# Patient Record
Sex: MALE | Race: BLACK OR AFRICAN AMERICAN | Employment: OTHER | URBAN - METROPOLITAN AREA
[De-identification: names, ages, dates, MRNs, and addresses within clinical notes are randomized per-mention and may not be internally consistent; named-entity substitution may affect disease eponyms.]

---

## 2020-01-26 ENCOUNTER — HOSPITAL ENCOUNTER (EMERGENCY)
Age: 75
Discharge: HOME | End: 2020-01-26
Attending: STUDENT IN AN ORGANIZED HEALTH CARE EDUCATION/TRAINING PROGRAM | Admitting: STUDENT IN AN ORGANIZED HEALTH CARE EDUCATION/TRAINING PROGRAM
Payer: MEDICARE

## 2020-01-26 ENCOUNTER — APPOINTMENT (OUTPATIENT)
Dept: GENERAL RADIOLOGY | Age: 75
End: 2020-01-26
Attending: STUDENT IN AN ORGANIZED HEALTH CARE EDUCATION/TRAINING PROGRAM
Payer: MEDICARE

## 2020-01-26 ENCOUNTER — APPOINTMENT (OUTPATIENT)
Dept: CT IMAGING | Age: 75
End: 2020-01-26
Attending: STUDENT IN AN ORGANIZED HEALTH CARE EDUCATION/TRAINING PROGRAM
Payer: MEDICARE

## 2020-01-26 VITALS
WEIGHT: 242 LBS | TEMPERATURE: 97.7 F | OXYGEN SATURATION: 99 % | RESPIRATION RATE: 18 BRPM | BODY MASS INDEX: 36.68 KG/M2 | SYSTOLIC BLOOD PRESSURE: 174 MMHG | DIASTOLIC BLOOD PRESSURE: 77 MMHG | HEIGHT: 68 IN | HEART RATE: 59 BPM

## 2020-01-26 DIAGNOSIS — R10.84 ABDOMINAL PAIN, GENERALIZED: Primary | ICD-10-CM

## 2020-01-26 DIAGNOSIS — N28.89 RIGHT RENAL MASS: ICD-10-CM

## 2020-01-26 DIAGNOSIS — R91.8 MULTIPLE LUNG NODULES ON CT: ICD-10-CM

## 2020-01-26 LAB
ALBUMIN SERPL-MCNC: 3.7 G/DL (ref 3.5–5)
ALBUMIN/GLOB SERPL: 0.9 {RATIO} (ref 1.1–2.2)
ALP SERPL-CCNC: 104 U/L (ref 45–117)
ALT SERPL-CCNC: 21 U/L (ref 12–78)
ANION GAP SERPL CALC-SCNC: 0 MMOL/L (ref 5–15)
APPEARANCE UR: CLEAR
AST SERPL-CCNC: 17 U/L (ref 15–37)
BACTERIA URNS QL MICRO: NEGATIVE /HPF
BASOPHILS # BLD: 0 K/UL (ref 0–0.1)
BASOPHILS NFR BLD: 0 % (ref 0–1)
BILIRUB SERPL-MCNC: 0.7 MG/DL (ref 0.2–1)
BILIRUB UR QL: NEGATIVE
BUN SERPL-MCNC: 17 MG/DL (ref 6–20)
BUN/CREAT SERPL: 12 (ref 12–20)
CALCIUM SERPL-MCNC: 9 MG/DL (ref 8.5–10.1)
CHLORIDE SERPL-SCNC: 107 MMOL/L (ref 97–108)
CO2 SERPL-SCNC: 29 MMOL/L (ref 21–32)
COLOR UR: ABNORMAL
COMMENT, HOLDF: NORMAL
CREAT SERPL-MCNC: 1.41 MG/DL (ref 0.7–1.3)
DIFFERENTIAL METHOD BLD: ABNORMAL
EOSINOPHIL # BLD: 0.1 K/UL (ref 0–0.4)
EOSINOPHIL NFR BLD: 2 % (ref 0–7)
EPITH CASTS URNS QL MICRO: ABNORMAL /LPF
ERYTHROCYTE [DISTWIDTH] IN BLOOD BY AUTOMATED COUNT: 14.4 % (ref 11.5–14.5)
GLOBULIN SER CALC-MCNC: 3.9 G/DL (ref 2–4)
GLUCOSE SERPL-MCNC: 105 MG/DL (ref 65–100)
GLUCOSE UR STRIP.AUTO-MCNC: NEGATIVE MG/DL
HCT VFR BLD AUTO: 39.7 % (ref 36.6–50.3)
HGB BLD-MCNC: 11.7 G/DL (ref 12.1–17)
HGB UR QL STRIP: NEGATIVE
HYALINE CASTS URNS QL MICRO: ABNORMAL /LPF (ref 0–5)
IMM GRANULOCYTES # BLD AUTO: 0 K/UL (ref 0–0.04)
IMM GRANULOCYTES NFR BLD AUTO: 0 % (ref 0–0.5)
KETONES UR QL STRIP.AUTO: NEGATIVE MG/DL
LEUKOCYTE ESTERASE UR QL STRIP.AUTO: NEGATIVE
LIPASE SERPL-CCNC: 103 U/L (ref 73–393)
LYMPHOCYTES # BLD: 0.8 K/UL (ref 0.8–3.5)
LYMPHOCYTES NFR BLD: 20 % (ref 12–49)
MCH RBC QN AUTO: 27.4 PG (ref 26–34)
MCHC RBC AUTO-ENTMCNC: 29.5 G/DL (ref 30–36.5)
MCV RBC AUTO: 93 FL (ref 80–99)
MONOCYTES # BLD: 0.2 K/UL (ref 0–1)
MONOCYTES NFR BLD: 6 % (ref 5–13)
NEUTS SEG # BLD: 2.7 K/UL (ref 1.8–8)
NEUTS SEG NFR BLD: 72 % (ref 32–75)
NITRITE UR QL STRIP.AUTO: NEGATIVE
NRBC # BLD: 0 K/UL (ref 0–0.01)
NRBC BLD-RTO: 0 PER 100 WBC
PH UR STRIP: 6.5 [PH] (ref 5–8)
PLATELET # BLD AUTO: 177 K/UL (ref 150–400)
PMV BLD AUTO: 11.2 FL (ref 8.9–12.9)
POTASSIUM SERPL-SCNC: 5 MMOL/L (ref 3.5–5.1)
PROT SERPL-MCNC: 7.6 G/DL (ref 6.4–8.2)
PROT UR STRIP-MCNC: ABNORMAL MG/DL
RBC # BLD AUTO: 4.27 M/UL (ref 4.1–5.7)
RBC #/AREA URNS HPF: ABNORMAL /HPF (ref 0–5)
RBC MORPH BLD: ABNORMAL
SAMPLES BEING HELD,HOLD: NORMAL
SODIUM SERPL-SCNC: 136 MMOL/L (ref 136–145)
SP GR UR REFRACTOMETRY: 1.01 (ref 1–1.03)
UR CULT HOLD, URHOLD: NORMAL
UROBILINOGEN UR QL STRIP.AUTO: 1 EU/DL (ref 0.2–1)
WBC # BLD AUTO: 3.8 K/UL (ref 4.1–11.1)
WBC URNS QL MICRO: ABNORMAL /HPF (ref 0–4)

## 2020-01-26 PROCEDURE — 36415 COLL VENOUS BLD VENIPUNCTURE: CPT

## 2020-01-26 PROCEDURE — 74176 CT ABD & PELVIS W/O CONTRAST: CPT

## 2020-01-26 PROCEDURE — 83690 ASSAY OF LIPASE: CPT

## 2020-01-26 PROCEDURE — 85025 COMPLETE CBC W/AUTO DIFF WBC: CPT

## 2020-01-26 PROCEDURE — 99284 EMERGENCY DEPT VISIT MOD MDM: CPT

## 2020-01-26 PROCEDURE — 80053 COMPREHEN METABOLIC PANEL: CPT

## 2020-01-26 PROCEDURE — 71045 X-RAY EXAM CHEST 1 VIEW: CPT

## 2020-01-26 PROCEDURE — 81001 URINALYSIS AUTO W/SCOPE: CPT

## 2020-01-26 NOTE — DISCHARGE INSTRUCTIONS
Patient Education        Abdominal Pain: Care Instructions  Your Care Instructions    Abdominal pain has many possible causes. Some aren't serious and get better on their own in a few days. Others need more testing and treatment. If your pain continues or gets worse, you need to be rechecked and may need more tests to find out what is wrong. You may need surgery to correct the problem. Don't ignore new symptoms, such as fever, nausea and vomiting, urination problems, pain that gets worse, and dizziness. These may be signs of a more serious problem. Your doctor may have recommended a follow-up visit in the next 8 to 12 hours. If you are not getting better, you may need more tests or treatment. The doctor has checked you carefully, but problems can develop later. If you notice any problems or new symptoms, get medical treatment right away. Follow-up care is a key part of your treatment and safety. Be sure to make and go to all appointments, and call your doctor if you are having problems. It's also a good idea to know your test results and keep a list of the medicines you take. How can you care for yourself at home? · Rest until you feel better. · To prevent dehydration, drink plenty of fluids, enough so that your urine is light yellow or clear like water. Choose water and other caffeine-free clear liquids until you feel better. If you have kidney, heart, or liver disease and have to limit fluids, talk with your doctor before you increase the amount of fluids you drink. · If your stomach is upset, eat mild foods, such as rice, dry toast or crackers, bananas, and applesauce. Try eating several small meals instead of two or three large ones. · Wait until 48 hours after all symptoms have gone away before you have spicy foods, alcohol, and drinks that contain caffeine. · Do not eat foods that are high in fat. · Avoid anti-inflammatory medicines such as aspirin, ibuprofen (Advil, Motrin), and naproxen (Aleve). These can cause stomach upset. Talk to your doctor if you take daily aspirin for another health problem. When should you call for help? Call 911 anytime you think you may need emergency care. For example, call if:    · You passed out (lost consciousness).     · You pass maroon or very bloody stools.     · You vomit blood or what looks like coffee grounds.     · You have new, severe belly pain.    Call your doctor now or seek immediate medical care if:    · Your pain gets worse, especially if it becomes focused in one area of your belly.     · You have a new or higher fever.     · Your stools are black and look like tar, or they have streaks of blood.     · You have unexpected vaginal bleeding.     · You have symptoms of a urinary tract infection. These may include:  ? Pain when you urinate. ? Urinating more often than usual.  ? Blood in your urine.     · You are dizzy or lightheaded, or you feel like you may faint.    Watch closely for changes in your health, and be sure to contact your doctor if:    · You are not getting better after 1 day (24 hours). Where can you learn more? Go to http://[employer] Telematicslandy.info/. Enter V023 in the search box to learn more about \"Abdominal Pain: Care Instructions. \"  Current as of: June 26, 2019  Content Version: 12.2  © 9868-1956 [employer]. Care instructions adapted under license by [employer] (which disclaims liability or warranty for this information). If you have questions about a medical condition or this instruction, always ask your healthcare professional. [place] [address] any warranty or liability for your use of this information. Patient Education        Learning About Lung Nodules  What is a lung nodule? A lung nodule is a growth in the lung. A single nodule surrounded by lung tissue is called a solitary pulmonary nodule. A lung nodule might not cause any symptoms.  Your doctor may have found one or more nodules on your lung when you were having a chest X-ray or CT scan. Or it may have been found during a lung cancer screening. A lung nodule may be caused by an old infection or cancer. It might also be a noncancerous growth. Lung nodules can cause a screening to give an abnormal result. Most nodules do not cause any harm. But without further tests, your doctor can't tell whether an abnormal finding is cancer, a harmless nodule, or something else. What can you expect when you have a lung nodule? Your doctor will look at several risk factors to see how likely it is that the nodule is cancer. He or she will look at:  · Whether you smoke or have ever smoked. · Your age and your family's medical history. · Whether you have ever had lung cancer. · The size and shape of the nodule. · Whether the nodule has changed in size. Your doctor may look at past chest X-rays or CT scans, if available, and compare them. Or you may have a series of CT scans to see if the nodule grows over time. What happens next depends on the risk of the nodule being cancer. · If you have no risk factors and the nodule is small, your doctor may advise doing nothing. · If the risk is small, your doctor may schedule follow-up appointments and tests. You may have more CT scans later to see if the nodule is growing. If the nodule hasn't changed in 3 to 6 months, you may have CT scans every year. If it hasn't changed in 2 years, you may not need any more tests. · If there's a higher risk of cancer, your doctor may:  ? Do a PET scan, which may help tell if the nodule is cancerous or not. ? Take a sample of tissue from the nodule for testing. This is called a biopsy. ? Remove the nodule with surgery. Follow-up care is a key part of your treatment and safety. Be sure to make and go to all appointments, and call your doctor if you are having problems.  It's also a good idea to know your test results and keep a list of the medicines you take.  Where can you learn more? Go to http://pipe-landy.info/. Enter X358 in the search box to learn more about \"Learning About Lung Nodules. \"  Current as of: December 19, 2018  Content Version: 12.2  © 2637-4792 [employer]. Care instructions adapted under license by [employer] (which disclaims liability or warranty for this information). If you have questions about a medical condition or this instruction, always ask your healthcare professional. [place] [address] any warranty or liability for your use of this information.

## 2020-01-26 NOTE — ED PROVIDER NOTES
69 y.o. male with past medical history significant for DM, HTN, arthritis, CAD, and kidney stones who presents from home with chief complaint of Abdominal Pain. Pt c/o pain in his upper lateral abdomen bilaterally with associated chills. Pt reports that he took some tylenol for his pain with little relief. Pt states that his symptoms feel similar to when he was evaluated a [facility] on 12/7/19. Per medical records, Pt's CT during his visit to [facility] showed a right kidney mass. Pt states that he was referred to [facility] ED by his PCP today. Pt denies any nausea, vomiting, fever, chest pain, or hematuria. Pt denies any Hx of abdominal surgeries. There are no other acute medical concerns at this time. Social hx: Former tobacco use (Quit 0071). Denies EtOH use. PCP: Britt Hancock MD    Note written by Zaynab Robins, as dictated by Ricky Carrizales MD 12:41 PM      The history is provided by the patient and medical records.         Past Medical History:   Diagnosis Date    Arthritis     CAD (coronary artery disease)     CABG    Diabetes ([facility] [address]     Heart failure (HCC)     Hypertension     Other ill-defined conditions(799.89)     kidney stones 1990's       Past Surgical History:   Procedure Laterality Date    CARDIAC SURG PROCEDURE UNLIST      endarterectomy, CABG    HX CORONARY ARTERY BYPASS GRAFT      HX ORTHOPAEDIC      right knee surgery 2009         Family History:   Problem Relation Age of Onset    Diabetes Mother     Heart Disease Father     Cancer Sister     Kidney Disease Brother        Social History     Socioeconomic History    Marital status:      Spouse name: Not on file    Number of children: Not on file    Years of education: Not on file    Highest education level: Not on file   Occupational History    Not on file   Social Needs    Financial resource strain: Not on file    Food insecurity:     Worry: Not on file     Inability: Not on file   Norma Joseline Transportation needs:     Medical: Not on file     Non-medical: Not on file   Tobacco Use    Smoking status: Former Smoker     Last attempt to quit: 1986     Years since quittin.0    Smokeless tobacco: Former User     Quit date: 1989   Substance and Sexual Activity    Alcohol use: No     Alcohol/week: 0.0 standard drinks    Drug use: No    Sexual activity: Yes     Partners: Female   Lifestyle    Physical activity:     Days per week: Not on file     Minutes per session: Not on file    Stress: Not on file   Relationships    Social connections:     Talks on phone: Not on file     Gets together: Not on file     Attends Tenriism service: Not on file     Active member of club or organization: Not on file     Attends meetings of clubs or organizations: Not on file     Relationship status: Not on file    Intimate partner violence:     Fear of current or ex partner: Not on file     Emotionally abused: Not on file     Physically abused: Not on file     Forced sexual activity: Not on file   Other Topics Concern    Not on file   Social History Narrative    Not on file         ALLERGIES: Patient has no known allergies. Review of Systems   Constitutional: Positive for chills. Negative for fever. HENT: Negative for sore throat. Eyes: Negative for pain. Respiratory: Negative for cough and shortness of breath. Cardiovascular: Negative for chest pain. Gastrointestinal: Positive for abdominal pain. Negative for nausea and vomiting. Genitourinary: Negative for dysuria and hematuria. Skin: Negative for rash. Neurological: Negative for syncope and headaches. Psychiatric/Behavioral: Negative for confusion. All other systems reviewed and are negative. Vitals:    20 1159   BP: 158/87   Pulse: (!) 59   Resp: 18   Temp: 97.7 °F (36.5 °C)   SpO2: 99%   Weight: 109.8 kg (242 lb)   Height: 5' 8\" (1.727 m)            Physical Exam  Vitals signs reviewed.    Constitutional:       General: He is not in acute distress. Appearance: He is well-developed. He is obese. HENT:      Head: Normocephalic and atraumatic. Eyes:      Conjunctiva/sclera: Conjunctivae normal.   Neck:      Musculoskeletal: Normal range of motion and neck supple. Cardiovascular:      Rate and Rhythm: Normal rate and regular rhythm. Heart sounds: Murmur present. Pulmonary:      Effort: Pulmonary effort is normal. No respiratory distress. Breath sounds: Normal breath sounds. Abdominal:      Palpations: Abdomen is soft. Tenderness: There is no tenderness. There is no guarding. Musculoskeletal: Normal range of motion. Skin:     General: Skin is warm and dry. Neurological:      Mental Status: He is alert and oriented to person, place, and time. Motor: No abnormal muscle tone. Note written by Zaynab Correia, as dictated by Breana Kent MD 12:41 PM    MDM       Procedures      The patient is resting comfortably and feels better, is alert and in no distress. The repeat examination is unremarkable and benign; in particular, there is no discomfort at McBurney's point and there is no pulsatile mass. The history, exam, diagnostic testing, and current condition do not suggest acute appendicitis, bowel obstruction, acute cholecystitis, bowel perforation, major gastrointestinal bleeding, severe diverticulitis, abdominal aortic aneurysm, mesenteric ischemia, volvulus, sepsis, or other significant pathology to warrant further testing, continued ED treatment, admission, or surgical evaluation at this point. The vital signs have been stable. The patient does not have uncontrollable pain, intractable vomiting, or other significant symptoms. The patient's condition is stable and appropriate for discharge from the emergency department.  The patient will pursue further outpatient evaluation with the primary care physician or other designated or consulting physician as indicated in the discharge instructions.

## 2020-01-26 NOTE — ED TRIAGE NOTES
Patient reports bilateral side pain for a few months. [facility] doctor told me if I had to come back, I had to go to [facility]. \" Seen at [facility] 12/7/19, told it was his gallbladder, also has right kidney mass. MRI schedules for kidney study 7/16/20.  Denies n/v/d.

## 2020-08-01 ENCOUNTER — APPOINTMENT (OUTPATIENT)
Dept: CT IMAGING | Age: 75
End: 2020-08-01
Attending: EMERGENCY MEDICINE
Payer: MEDICARE

## 2020-08-01 ENCOUNTER — APPOINTMENT (OUTPATIENT)
Dept: CT IMAGING | Age: 75
End: 2020-08-01
Attending: INTERNAL MEDICINE
Payer: MEDICARE

## 2020-08-01 ENCOUNTER — HOSPITAL ENCOUNTER (OUTPATIENT)
Age: 75
Setting detail: OBSERVATION
Discharge: HOME HEALTH | End: 2020-08-03
Attending: EMERGENCY MEDICINE | Admitting: INTERNAL MEDICINE
Payer: MEDICARE

## 2020-08-01 DIAGNOSIS — N17.9 ACUTE KIDNEY INJURY (HCC): Primary | ICD-10-CM

## 2020-08-01 DIAGNOSIS — E87.8 BLOOD CO2 DECREASED: ICD-10-CM

## 2020-08-01 DIAGNOSIS — E87.1 HYPONATREMIA: ICD-10-CM

## 2020-08-01 PROBLEM — N19 RENAL FAILURE: Status: ACTIVE | Noted: 2020-08-01

## 2020-08-01 PROBLEM — E87.20 METABOLIC ACIDOSIS: Status: ACTIVE | Noted: 2020-08-01

## 2020-08-01 LAB
ALBUMIN SERPL-MCNC: 3.7 G/DL (ref 3.5–5)
ALBUMIN/GLOB SERPL: 1 {RATIO} (ref 1.1–2.2)
ALP SERPL-CCNC: 97 U/L (ref 45–117)
ALT SERPL-CCNC: 16 U/L (ref 12–78)
ANION GAP SERPL CALC-SCNC: 14 MMOL/L (ref 5–15)
APPEARANCE UR: CLEAR
AST SERPL-CCNC: 20 U/L (ref 15–37)
BACTERIA URNS QL MICRO: NEGATIVE /HPF
BASOPHILS # BLD: 0 K/UL (ref 0–0.1)
BASOPHILS NFR BLD: 0 % (ref 0–1)
BILIRUB SERPL-MCNC: 0.7 MG/DL (ref 0.2–1)
BILIRUB UR QL: NEGATIVE
BUN SERPL-MCNC: 53 MG/DL (ref 6–20)
BUN/CREAT SERPL: 21 (ref 12–20)
CALCIUM SERPL-MCNC: 8.8 MG/DL (ref 8.5–10.1)
CHLORIDE SERPL-SCNC: 101 MMOL/L (ref 97–108)
CO2 SERPL-SCNC: 15 MMOL/L (ref 21–32)
COLOR UR: NORMAL
COMMENT, HOLDF: NORMAL
COMMENT, HOLDF: NORMAL
CREAT SERPL-MCNC: 2.55 MG/DL (ref 0.7–1.3)
DIFFERENTIAL METHOD BLD: ABNORMAL
EOSINOPHIL # BLD: 0.1 K/UL (ref 0–0.4)
EOSINOPHIL NFR BLD: 3 % (ref 0–7)
EPITH CASTS URNS QL MICRO: NORMAL /LPF
ERYTHROCYTE [DISTWIDTH] IN BLOOD BY AUTOMATED COUNT: 14.6 % (ref 11.5–14.5)
GLOBULIN SER CALC-MCNC: 3.8 G/DL (ref 2–4)
GLUCOSE SERPL-MCNC: 85 MG/DL (ref 65–100)
GLUCOSE UR STRIP.AUTO-MCNC: NEGATIVE MG/DL
HCT VFR BLD AUTO: 38.6 % (ref 36.6–50.3)
HGB BLD-MCNC: 12.1 G/DL (ref 12.1–17)
HGB UR QL STRIP: NEGATIVE
IMM GRANULOCYTES # BLD AUTO: 0 K/UL (ref 0–0.04)
IMM GRANULOCYTES NFR BLD AUTO: 0 % (ref 0–0.5)
KETONES UR QL STRIP.AUTO: NEGATIVE MG/DL
LACTATE SERPL-SCNC: 4.1 MMOL/L (ref 0.4–2)
LEUKOCYTE ESTERASE UR QL STRIP.AUTO: NEGATIVE
LYMPHOCYTES # BLD: 0.9 K/UL (ref 0.8–3.5)
LYMPHOCYTES NFR BLD: 21 % (ref 12–49)
MCH RBC QN AUTO: 28.2 PG (ref 26–34)
MCHC RBC AUTO-ENTMCNC: 31.3 G/DL (ref 30–36.5)
MCV RBC AUTO: 90 FL (ref 80–99)
MONOCYTES # BLD: 0.2 K/UL (ref 0–1)
MONOCYTES NFR BLD: 5 % (ref 5–13)
NEUTS SEG # BLD: 3.2 K/UL (ref 1.8–8)
NEUTS SEG NFR BLD: 71 % (ref 32–75)
NITRITE UR QL STRIP.AUTO: NEGATIVE
NRBC # BLD: 0 K/UL (ref 0–0.01)
NRBC BLD-RTO: 0 PER 100 WBC
PH UR STRIP: 5 [PH] (ref 5–8)
PLATELET # BLD AUTO: 165 K/UL (ref 150–400)
PMV BLD AUTO: 10.5 FL (ref 8.9–12.9)
POTASSIUM SERPL-SCNC: 5.1 MMOL/L (ref 3.5–5.1)
PROT SERPL-MCNC: 7.5 G/DL (ref 6.4–8.2)
PROT UR STRIP-MCNC: NEGATIVE MG/DL
RBC # BLD AUTO: 4.29 M/UL (ref 4.1–5.7)
RBC #/AREA URNS HPF: NORMAL /HPF (ref 0–5)
SAMPLES BEING HELD,HOLD: NORMAL
SAMPLES BEING HELD,HOLD: NORMAL
SODIUM SERPL-SCNC: 130 MMOL/L (ref 136–145)
SP GR UR REFRACTOMETRY: 1.01 (ref 1–1.03)
TROPONIN I SERPL-MCNC: <0.05 NG/ML
UR CULT HOLD, URHOLD: NORMAL
UROBILINOGEN UR QL STRIP.AUTO: 1 EU/DL (ref 0.2–1)
WBC # BLD AUTO: 4.5 K/UL (ref 4.1–11.1)
WBC URNS QL MICRO: NORMAL /HPF (ref 0–4)

## 2020-08-01 PROCEDURE — 65390000012 HC CONDITION CODE 44 OBSERVATION

## 2020-08-01 PROCEDURE — 74176 CT ABD & PELVIS W/O CONTRAST: CPT

## 2020-08-01 PROCEDURE — 36415 COLL VENOUS BLD VENIPUNCTURE: CPT

## 2020-08-01 PROCEDURE — 84484 ASSAY OF TROPONIN QUANT: CPT

## 2020-08-01 PROCEDURE — 73700 CT LOWER EXTREMITY W/O DYE: CPT

## 2020-08-01 PROCEDURE — 83605 ASSAY OF LACTIC ACID: CPT

## 2020-08-01 PROCEDURE — 65660000000 HC RM CCU STEPDOWN

## 2020-08-01 PROCEDURE — 80053 COMPREHEN METABOLIC PANEL: CPT

## 2020-08-01 PROCEDURE — 85025 COMPLETE CBC W/AUTO DIFF WBC: CPT

## 2020-08-01 PROCEDURE — 93005 ELECTROCARDIOGRAM TRACING: CPT

## 2020-08-01 PROCEDURE — 99283 EMERGENCY DEPT VISIT LOW MDM: CPT

## 2020-08-01 PROCEDURE — 81001 URINALYSIS AUTO W/SCOPE: CPT

## 2020-08-01 PROCEDURE — 74011250636 HC RX REV CODE- 250/636: Performed by: EMERGENCY MEDICINE

## 2020-08-01 RX ORDER — ONDANSETRON 2 MG/ML
4 INJECTION INTRAMUSCULAR; INTRAVENOUS
Status: DISCONTINUED | OUTPATIENT
Start: 2020-08-01 | End: 2020-08-03 | Stop reason: HOSPADM

## 2020-08-01 RX ORDER — LISINOPRIL 5 MG/1
5 TABLET ORAL DAILY
COMMUNITY
End: 2020-08-03

## 2020-08-01 RX ORDER — ONDANSETRON 4 MG/1
4 TABLET, ORALLY DISINTEGRATING ORAL
Status: DISCONTINUED | OUTPATIENT
Start: 2020-08-01 | End: 2020-08-03 | Stop reason: HOSPADM

## 2020-08-01 RX ORDER — BUMETANIDE 2 MG/1
2 TABLET ORAL DAILY
COMMUNITY
End: 2020-08-03

## 2020-08-01 RX ORDER — SODIUM CHLORIDE 0.9 % (FLUSH) 0.9 %
5-40 SYRINGE (ML) INJECTION EVERY 8 HOURS
Status: DISCONTINUED | OUTPATIENT
Start: 2020-08-01 | End: 2020-08-03 | Stop reason: HOSPADM

## 2020-08-01 RX ORDER — POTASSIUM CHLORIDE 750 MG/1
10 TABLET, FILM COATED, EXTENDED RELEASE ORAL
COMMUNITY

## 2020-08-01 RX ORDER — ASPIRIN 81 MG/1
81 TABLET ORAL DAILY
COMMUNITY
End: 2021-08-14

## 2020-08-01 RX ORDER — SODIUM CHLORIDE 0.9 % (FLUSH) 0.9 %
5-40 SYRINGE (ML) INJECTION AS NEEDED
Status: DISCONTINUED | OUTPATIENT
Start: 2020-08-01 | End: 2020-08-03 | Stop reason: HOSPADM

## 2020-08-01 RX ORDER — ACETAMINOPHEN 325 MG/1
650 TABLET ORAL
Status: DISCONTINUED | OUTPATIENT
Start: 2020-08-01 | End: 2020-08-03 | Stop reason: HOSPADM

## 2020-08-01 RX ORDER — ACETAMINOPHEN 650 MG/1
650 SUPPOSITORY RECTAL
Status: DISCONTINUED | OUTPATIENT
Start: 2020-08-01 | End: 2020-08-03 | Stop reason: HOSPADM

## 2020-08-01 RX ORDER — ALLOPURINOL 100 MG/1
100 TABLET ORAL DAILY
COMMUNITY

## 2020-08-01 RX ORDER — SODIUM CHLORIDE 9 MG/ML
100 INJECTION, SOLUTION INTRAVENOUS ONCE
Status: COMPLETED | OUTPATIENT
Start: 2020-08-01 | End: 2020-08-02

## 2020-08-01 RX ORDER — POLYETHYLENE GLYCOL 3350 17 G/17G
17 POWDER, FOR SOLUTION ORAL DAILY PRN
Status: DISCONTINUED | OUTPATIENT
Start: 2020-08-01 | End: 2020-08-03 | Stop reason: HOSPADM

## 2020-08-01 RX ORDER — PANTOPRAZOLE SODIUM 40 MG/1
40 TABLET, DELAYED RELEASE ORAL DAILY
COMMUNITY

## 2020-08-01 RX ADMIN — SODIUM CHLORIDE 1000 ML: 9 INJECTION, SOLUTION INTRAVENOUS at 21:18

## 2020-08-01 NOTE — ED PROVIDER NOTES
Patient is a 78-year-old male with history of coronary artery disease status post CABG, insulin-dependent diabetes, CHF, hypertension, arthritis presenting to emergency department status post fall which occurred 1 to 2 hours prior to arrival.  Patient reports that he was getting out of a truck when his \"legs gave out\". He also notes that this is a common problem for him, he had a fall yesterday as well, and has previously gone through rehab for lower extremity weakness. He denies any preceding dizziness causing him to fall. He denies blow to head or loss of consciousness. He is not on a blood thinner. He is currently complaining of right hip pain and difficulty walking secondary to pain in feelings of weakness in bilateral legs. He denies headache, vision changes, neck pain, chest pain, shortness breath abdominal pain, nausea, vomiting, diarrhea, leg swelling, or any other medical planes or injuries at this time.            Past Medical History:   Diagnosis Date    Arthritis     CAD (coronary artery disease)     CABG    Diabetes ([facility] [address]     Heart failure (HCC)     Hypertension     Other ill-defined conditions(799.89)     kidney stones 1990's       Past Surgical History:   Procedure Laterality Date    CARDIAC SURG PROCEDURE UNLIST      endarterectomy, CABG    HX CORONARY ARTERY BYPASS GRAFT      HX ORTHOPAEDIC      right knee surgery 2009         Family History:   Problem Relation Age of Onset    Diabetes Mother     Heart Disease Father     Cancer Sister     Kidney Disease Brother        Social History     Socioeconomic History    Marital status:      Spouse name: Not on file    Number of children: Not on file    Years of education: Not on file    Highest education level: Not on file   Occupational History    Not on file   Social Needs    Financial resource strain: Not on file    Food insecurity     Worry: Not on file     Inability: Not on file    Transportation needs     Medical: Not on file     Non-medical: Not on file   Tobacco Use    Smoking status: Former Smoker     Last attempt to quit: 1986     Years since quittin.6    Smokeless tobacco: Former User     Quit date: 1989   Substance and Sexual Activity    Alcohol use: No     Alcohol/week: 0.0 standard drinks    Drug use: No    Sexual activity: Yes     Partners: Female   Lifestyle    Physical activity     Days per week: Not on file     Minutes per session: Not on file    Stress: Not on file   Relationships    Social connections     Talks on phone: Not on file     Gets together: Not on file     Attends Pentecostal service: Not on file     Active member of club or organization: Not on file     Attends meetings of clubs or organizations: Not on file     Relationship status: Not on file    Intimate partner violence     Fear of current or ex partner: Not on file     Emotionally abused: Not on file     Physically abused: Not on file     Forced sexual activity: Not on file   Other Topics Concern    Not on file   Social History Narrative    Not on file         ALLERGIES: Patient has no known allergies. Review of Systems   Constitutional: Negative for chills and fever. HENT: Negative for ear pain and sore throat. Eyes: Negative for visual disturbance. Respiratory: Negative for cough and shortness of breath. Cardiovascular: Negative for chest pain. Gastrointestinal: Negative for abdominal pain, diarrhea, nausea and vomiting. Genitourinary: Negative for flank pain. Musculoskeletal: Positive for arthralgias (right hip pain) and gait problem (Secondary to right hip pain ). Negative for back pain, joint swelling, myalgias, neck pain and neck stiffness. Skin: Negative for color change. Neurological: Positive for weakness (chronic, unchanged). Negative for dizziness, tremors, seizures, syncope, numbness and headaches. Psychiatric/Behavioral: Negative for confusion.        Vitals:    20 1901   BP: 118/67 Pulse: 73   Resp: 16   Temp: 98.4 °F (36.9 °C)   SpO2: 95%            Physical Exam  Vitals signs and nursing note reviewed. Constitutional:       General: He is not in acute distress. Appearance: Normal appearance. He is not ill-appearing. HENT:      Head: Normocephalic and atraumatic. No raccoon eyes, Bhat's sign or contusion. Jaw: There is normal jaw occlusion. Right Ear: No tenderness. Left Ear: No tenderness. Mouth/Throat:      Pharynx: Oropharynx is clear. Eyes:      General: No visual field deficit. Extraocular Movements: Extraocular movements intact. Conjunctiva/sclera: Conjunctivae normal.   Neck:      Musculoskeletal: Normal range of motion. No neck rigidity or muscular tenderness. Cardiovascular:      Rate and Rhythm: Normal rate and regular rhythm. Pulses:           Posterior tibial pulses are 2+ on the right side and 2+ on the left side. Pulmonary:      Effort: Pulmonary effort is normal. No respiratory distress. Breath sounds: Normal breath sounds. No wheezing. Chest:      Chest wall: No tenderness. Abdominal:      General: There is no distension. Palpations: Abdomen is soft. Tenderness: There is no abdominal tenderness. There is no right CVA tenderness, left CVA tenderness or guarding. Musculoskeletal: Normal range of motion. Right shoulder: Normal.      Left shoulder: Normal.      Right hip: He exhibits tenderness and bony tenderness. He exhibits normal range of motion, normal strength, no swelling, no crepitus, no deformity and no laceration. Left hip: Normal.      Right knee: Normal.      Left knee: Normal.      Right ankle: Normal.      Left ankle: Normal.      Cervical back: Normal.      Right lower leg: No edema. Left lower leg: No edema. Skin:     General: Skin is warm and dry. Neurological:      General: No focal deficit present. Mental Status: He is alert and oriented to person, place, and time. Cranial Nerves: Cranial nerves are intact. No cranial nerve deficit, dysarthria or facial asymmetry. Sensory: Sensation is intact. Motor: Motor function is intact. No tremor. Coordination: Coordination is intact. Psychiatric:         Mood and Affect: Mood normal.          MDM  Number of Diagnoses or Management Options  Acute kidney injury [facility]):   Blood CO2 decreased:   Hyponatremia:   Diagnosis management comments: Patient is alert, well-appearing, afebrile, vital stable. History of chronic lower extremity weakness and frequent falls, he had a fall this afternoon while getting out of the truck, and fell over onto his right hip. He denies preceding dizziness, blow to the head, or loss of consciousness. He is not taking blood thinning medication. Full physical exam shows tenderness to the right hip, no additional tenderness and head to toe exam.  Abdomen soft, nondistended, nontender. No midline tenderness. No focal neurologic deficits on exam.  Patient has difficulty walking at baseline, but notes that he is unable to walk currently due to pain in the right hip. Due to age and comorbidities, EKG and lab work was obtained to evaluate for cardiac or metabolic cause for fall.    8:56 PM  CT scan of right hip is without fracture or dislocation. EKG without acute changes, troponin x1-, creatinine markedly increased from baseline 2.55 with evidence of metabolic acidosis with decreased CO2 of 15. Patient started on 1 L of IV fluid bolus, no signs of fluid overload at this time. Patient to be admitted to hospital service for treatment of JULIO CESAR and possible dehydration, Discussed patient with attending ED MD Fuad Borges MD who agrees with current management plan. Patient is admitted to hospital for further work-up, observation, and management. Hospitalist notified and agrees.   Attending ED provider is aware of patient and has had the opportunity to personally evaluate the patient, and agrees with admission and current plan. Patient informed of current management plan. All questions answered at this time. Will continue to monitor patient while in ED. Amount and/or Complexity of Data Reviewed  Clinical lab tests: reviewed  Tests in the radiology section of CPT®: reviewed  Tests in the medicine section of CPT®: reviewed  Discuss the patient with other providers: yes (Attending Dr. Netta Nicholson )      ED Course as of Aug 01 2049   Sat Aug 01, 2020   2007 FINDINGS: There is no hip fracture or dislocation. There is no pelvic fracture. There is no hip joint effusion. Soft tissues show no acute finding.     IMPRESSION  IMPRESSION: No fracture or dislocation. CT LOW EXT RT WO CONT [EH]   2019 ED EKG interpretation:8:19 PM  Rhythm: normal sinus rhythm; and regular. Rate (approx.): 76; Axis: normal; P wave: normal; ST/T wave: no concerning ST elevations or depressions; Other findings: PACs present. DAVID Monte        []   2025 CBC WITH AUTOMATED DIFF(!):    WBC 4.5   RBC 4.29   HGB 12.1   HCT 38.6   MCV 90.0   MCH 28.2   MCHC 31.3   RDW 14.6(!)   PLATELET 425   MPV 89.2   NRBC 0.0   ABSOLUTE NRBC 0.00   NEUTROPHILS 71   LYMPHOCYTES 21   MONOCYTES 5   EOSINOPHILS 3   BASOPHILS 0   IMMATURE GRANULOCYTES 0   ABS. NEUTROPHILS 3.2   ABS. LYMPHOCYTES 0.9   ABS. MONOCYTES 0.2   ABS. EOSINOPHILS 0.1   ABS. BASOPHILS 0.0   ABS. IMM. GRANS. 0.0   DF AUTOMATED []   9610 METABOLIC PANEL, COMPREHENSIVE(!!):    Sodium 130(!)   Potassium 5.1   Chloride 101   CO2 15(!!)   Anion gap 14   Glucose 85   BUN 53(!)   Creatinine 2.55(!)   BUN/Creatinine ratio 21(!)   GFR est AA 30(!)   GFR est non-AA 25(!)   Calcium 8.8   Bilirubin, total 0.7   ALT 16   AST 20   Alk.  phosphatase 97   Protein, total 7.5   Albumin 3.7   Globulin 3.8   A-G Ratio 1.0(!) [EH]   2044 TROPONIN I:    Troponin-I, Qt. <0.05 []      ED Course User Index  [] DAVID George     Hospitalist Perfect Serve for Admission  8:50 PM    ED Room Number: R36/R36  Patient Name and age:  Leah Manzano 69 y.o.  male  Working Diagnosis:   1. Acute kidney injury ([address]    2. Blood CO2 decreased        COVID-19 Suspicion:  no    Code Status:  Full Code  Readmission: no  Isolation Requirements:  no  Recommended Level of Care:  med/surg  Department:[facility] - 21   Other: Patient is a 71-year-old male CHF, coronary artery disease, insulin-dependent diabetes who presented to emergency department for a fall this afternoon due to lower extremity weakness, which is a chronic ongoing problem for him. No dizziness, head injury, or syncope. Not taking a blood thinner. Complaining of right hip pain, CT of right hip was negative for acute fracture or dislocation. Lab work showed markedly increased creatinine of 2.55 and decreased CO2 of 15. Metabolic acidosis versus possible dehydration or JULIO CESAR. Urinalysis is pending, I have  started 1 L IV fluids for gentle rehydration.         Procedures

## 2020-08-01 NOTE — ED TRIAGE NOTES
Triage: Pt arrives from home with CC of a fall earlier today when his knees gave out on him. He reports some baseline weakness in his BLE for which he has been to rehab. He denies any dizziness or LOC. Reports he landed on his right side and has pain to the right flank/rib cage area as well as the right hip.

## 2020-08-02 ENCOUNTER — APPOINTMENT (OUTPATIENT)
Dept: ULTRASOUND IMAGING | Age: 75
End: 2020-08-02
Attending: INTERNAL MEDICINE
Payer: MEDICARE

## 2020-08-02 LAB
ANION GAP SERPL CALC-SCNC: 9 MMOL/L (ref 5–15)
BASOPHILS # BLD: 0 K/UL (ref 0–0.1)
BASOPHILS NFR BLD: 0 % (ref 0–1)
BUN SERPL-MCNC: 52 MG/DL (ref 6–20)
BUN/CREAT SERPL: 22 (ref 12–20)
CALCIUM SERPL-MCNC: 8.2 MG/DL (ref 8.5–10.1)
CHLORIDE SERPL-SCNC: 104 MMOL/L (ref 97–108)
CK SERPL-CCNC: 243 U/L (ref 39–308)
CO2 SERPL-SCNC: 20 MMOL/L (ref 21–32)
CREAT SERPL-MCNC: 2.33 MG/DL (ref 0.7–1.3)
DIFFERENTIAL METHOD BLD: ABNORMAL
EOSINOPHIL # BLD: 0.1 K/UL (ref 0–0.4)
EOSINOPHIL NFR BLD: 3 % (ref 0–7)
ERYTHROCYTE [DISTWIDTH] IN BLOOD BY AUTOMATED COUNT: 14.5 % (ref 11.5–14.5)
GLUCOSE SERPL-MCNC: 95 MG/DL (ref 65–100)
HCT VFR BLD AUTO: 34.6 % (ref 36.6–50.3)
HGB BLD-MCNC: 11.2 G/DL (ref 12.1–17)
IMM GRANULOCYTES # BLD AUTO: 0 K/UL (ref 0–0.04)
IMM GRANULOCYTES NFR BLD AUTO: 0 % (ref 0–0.5)
LACTATE SERPL-SCNC: 0.9 MMOL/L (ref 0.4–2)
LYMPHOCYTES # BLD: 0.7 K/UL (ref 0.8–3.5)
LYMPHOCYTES NFR BLD: 17 % (ref 12–49)
MCH RBC QN AUTO: 28.6 PG (ref 26–34)
MCHC RBC AUTO-ENTMCNC: 32.4 G/DL (ref 30–36.5)
MCV RBC AUTO: 88.3 FL (ref 80–99)
MONOCYTES # BLD: 0.2 K/UL (ref 0–1)
MONOCYTES NFR BLD: 6 % (ref 5–13)
NEUTS SEG # BLD: 3 K/UL (ref 1.8–8)
NEUTS SEG NFR BLD: 74 % (ref 32–75)
NRBC # BLD: 0 K/UL (ref 0–0.01)
NRBC BLD-RTO: 0 PER 100 WBC
PLATELET # BLD AUTO: 135 K/UL (ref 150–400)
PMV BLD AUTO: 10.5 FL (ref 8.9–12.9)
POTASSIUM SERPL-SCNC: 4.9 MMOL/L (ref 3.5–5.1)
RBC # BLD AUTO: 3.92 M/UL (ref 4.1–5.7)
RBC MORPH BLD: ABNORMAL
SODIUM SERPL-SCNC: 133 MMOL/L (ref 136–145)
WBC # BLD AUTO: 4 K/UL (ref 4.1–11.1)

## 2020-08-02 PROCEDURE — 83605 ASSAY OF LACTIC ACID: CPT

## 2020-08-02 PROCEDURE — 80048 BASIC METABOLIC PNL TOTAL CA: CPT

## 2020-08-02 PROCEDURE — 74011250636 HC RX REV CODE- 250/636: Performed by: INTERNAL MEDICINE

## 2020-08-02 PROCEDURE — 99218 HC RM OBSERVATION: CPT

## 2020-08-02 PROCEDURE — 85025 COMPLETE CBC W/AUTO DIFF WBC: CPT

## 2020-08-02 PROCEDURE — 82550 ASSAY OF CK (CPK): CPT

## 2020-08-02 PROCEDURE — 36415 COLL VENOUS BLD VENIPUNCTURE: CPT

## 2020-08-02 PROCEDURE — 96372 THER/PROPH/DIAG INJ SC/IM: CPT

## 2020-08-02 PROCEDURE — 65390000012 HC CONDITION CODE 44 OBSERVATION

## 2020-08-02 RX ORDER — HEPARIN SODIUM 5000 [USP'U]/ML
5000 INJECTION, SOLUTION INTRAVENOUS; SUBCUTANEOUS EVERY 8 HOURS
Status: DISCONTINUED | OUTPATIENT
Start: 2020-08-02 | End: 2020-08-03 | Stop reason: HOSPADM

## 2020-08-02 RX ORDER — SODIUM CHLORIDE 9 MG/ML
50 INJECTION, SOLUTION INTRAVENOUS CONTINUOUS
Status: DISCONTINUED | OUTPATIENT
Start: 2020-08-02 | End: 2020-08-03 | Stop reason: HOSPADM

## 2020-08-02 RX ADMIN — Medication 10 ML: at 00:27

## 2020-08-02 RX ADMIN — Medication 10 ML: at 06:49

## 2020-08-02 RX ADMIN — SODIUM CHLORIDE 100 ML/HR: 900 INJECTION, SOLUTION INTRAVENOUS at 00:27

## 2020-08-02 RX ADMIN — HEPARIN SODIUM 5000 UNITS: 5000 INJECTION, SOLUTION INTRAVENOUS; SUBCUTANEOUS at 15:37

## 2020-08-02 RX ADMIN — SODIUM CHLORIDE 50 ML/HR: 900 INJECTION, SOLUTION INTRAVENOUS at 13:17

## 2020-08-02 RX ADMIN — Medication 10 ML: at 13:17

## 2020-08-02 NOTE — PROGRESS NOTES
Bedside shift change report given to Franc Mace RN (oncoming nurse) by Bashir Armenta RN (offgoing nurse). Report included the following information SBAR, Kardex, ED Summary, Procedure Summary, Intake/Output, MAR, Accordion, Recent Results, Med Rec Status, Cardiac Rhythm NSR, Alarm Parameters , Pre Procedure Checklist, Procedure Verification and Quality Measures. Problem: Falls - Risk of  Goal: *Absence of Falls  Description: Document Donata Carrel Fall Risk and appropriate interventions in the flowsheet. Outcome: Progressing Towards Goal  Note: Fall Risk Interventions:  Mobility Interventions: Patient to call before getting OOB  Elimination Interventions: Patient to call for help with toileting needs    History of Falls Interventions: Investigate reason for fall    TRANSFER - IN REPORT:    Verbal report received from Britton Rosenthal RN(name) on Joseph Carter  being received from ED(unit) for routine progression of care      Report consisted of patients Situation, Background, Assessment and   Recommendations(SBAR). Information from the following report(s) SBAR, Kardex, ED Summary, Procedure Summary, Intake/Output, MAR, Accordion, Recent Results, Med Rec Status, Cardiac Rhythm NSR, Alarm Parameters , Pre Procedure Checklist, Procedure Verification and Quality Measures was reviewed with the receiving nurse. Opportunity for questions and clarification was provided. Assessment completed upon patients arrival to unit and care assumed.      Primary Nurse Sienna Brooks RN and Gaetano Sibley RN performed a dual skin assessment on this patient No impairment noted  John score is 19

## 2020-08-02 NOTE — ROUTINE PROCESS
TRANSFER - OUT REPORT: 
 
Verbal report given to 3N RN(name) on Collette Macdonald  being transferred to 352(unit) for routine progression of care Report consisted of patients Situation, Background, Assessment and  
Recommendations(SBAR). Information from the following report(s) SBAR, ED Summary, Intake/Output, MAR and Recent Results was reviewed with the receiving nurse. Lines:  
Peripheral IV 08/01/20 Left Forearm (Active) Site Assessment Clean, dry, & intact 08/01/20 2227 Phlebitis Assessment 0 08/01/20 2227 Infiltration Assessment 0 08/01/20 2227 Dressing Status Clean, dry, & intact 08/01/20 2227 Dressing Type Transparent 08/01/20 2227 Opportunity for questions and clarification was provided. Patient transported with: 
 Registered Nurse

## 2020-08-02 NOTE — H&P
[address] [facility] Adult  Hospitalist Group  History and Physical    Primary Care Provider: Helen Jimenez MD  Date of Service:  8/1/2020    Subjective:     Von Moore is a 69 y.o. male with past medical history significant for CAD status post CABG, diabetes mellitus type 2, congestive heart failure, hypertension and arthritis presented emergency room after he had a fall today. Patient states that he was trying to get out of the truck when his leg gave out. He felt like his both his legs are weak. He does have arthritis and symptoms difficult for him to walk. He also had a fall yesterday because his legs were weak. He denies any head trauma loss of consciousness. Denies any symptoms such as shortness of breath, lightheadedness, palpitation, chest pain prior to the fall. He has been in his usual state of health, denies fever, denies recent infections, decreased fatigue lately. In the emergency room patient was found to be hemodynamically stable. Blood test revealed a creatinine of 2.5 as well as acidosis. Patient denies any recent medication, he denies using NSAIDs for arthritis. He has been compliant with his medication including the metformin and Bumex. He denies decreased p.o. intake, new onset diarrhea, vomiting, changes in the urine output. Hospitalist was consulted for further management of acute kidney injury. Review of Systems:    Review of Systems   Constitutional: Negative for chills, fever, malaise/fatigue and weight loss. HENT: Negative for congestion, ear discharge and hearing loss. Eyes: Negative for blurred vision and double vision. Respiratory: Negative for cough, sputum production, shortness of breath and wheezing. Cardiovascular: Negative for chest pain, palpitations, orthopnea, leg swelling and PND. Gastrointestinal: Negative for abdominal pain, constipation, diarrhea, melena, nausea and vomiting.    Genitourinary: Negative for dysuria, frequency and urgency. Musculoskeletal: Positive for falls and joint pain (right hip  and knee pain). Negative for back pain and myalgias. Bilateral LE weakness. Skin: Negative for itching and rash. Neurological: Negative for dizziness, sensory change, speech change, focal weakness, weakness and headaches. Endo/Heme/Allergies: Negative for polydipsia. Does not bruise/bleed easily. Past Medical History:   Diagnosis Date    Arthritis     CAD (coronary artery disease)     CABG    Diabetes ([facility] [address]     Heart failure (HCC)     Hypertension     Other ill-defined conditions(799.89)     kidney stones 1990's      Past Surgical History:   Procedure Laterality Date    CARDIAC SURG PROCEDURE UNLIST      endarterectomy, CABG    HX CORONARY ARTERY BYPASS GRAFT      HX ORTHOPAEDIC      right knee surgery 2009     Prior to Admission medications    Medication Sig Start Date End Date Taking? Authorizing Provider   allopurinoL (ZYLOPRIM) 100 mg tablet Take 100 mg by mouth daily. Yes Provider, Historical   potassium chloride SR (KLOR-CON 10) 10 mEq tablet Take 10 mEq by mouth. Yes Provider, Historical   pantoprazole (PROTONIX) 40 mg tablet Take 40 mg by mouth daily. Yes Provider, Historical   aspirin delayed-release 81 mg tablet Take 81 mg by mouth daily. Yes Provider, Historical   lisinopriL (PRINIVIL, ZESTRIL) 5 mg tablet Take 5 mg by mouth daily. Yes Provider, Historical   bumetanide (BUMEX) 2 mg tablet Take 2 mg by mouth daily. Yes Provider, Historical   atorvastatin (LIPITOR) 40 mg tablet Take 80 mg by mouth daily. Yes Other, MD Osei   carvedilol (COREG) 6.25 mg tablet Take 1 Tab by mouth two (2) times daily (with meals). 4/6/12  Yes Radha Cornelius MD   metFORMIN (GLUCOPHAGE) 500 mg tablet Take 1,000 mg by mouth two (2) times daily (with meals).  Indications: type 2 diabetes mellitus   Yes Other, MD Osei   Insulin Lisp & Lisp Prot, Hum, (HUMALOG PEN) 100 unit/mL (75-25) flexpen 50 Units by SubCUTAneous route two (2) times a day. Yes Osei Aviles MD   predniSONE (DELTASONE) 10 mg tablet Take 20 mg by mouth daily (with breakfast). Osei Aviles MD   prasugrel (EFFIENT) 10 mg tablet Take  by mouth daily. Osei Aviles MD   indomethacin (INDOCIN) 50 mg capsule Take  by mouth three (3) times daily as needed. Osei Aviles MD   furosemide (LASIX) 40 mg tablet Take PO BID 8/11/12   Mike Grimm MD   trandolapril (MAVIK) 1 mg tablet Take 4 Tabs by mouth daily. 4/6/12   Purvi Yee MD   rosuvastatin (CRESTOR) 10 mg tablet Take 1 Tab by mouth daily. 7/22/11   Juan Aguirre MD   nitroglycerin (NITROSTAT) 0.4 mg SL tablet 1 Tab by SubLINGual route every five (5) minutes as needed for Chest Pain. 7/22/11   Juan Aguirre MD   furosemide (LASIX) 40 mg tablet Take 40 mg by mouth daily. Osei Aviles MD     No Known Allergies     Family History   Problem Relation Age of Onset    Diabetes Mother     Heart Disease Father     Cancer Sister     Kidney Disease Brother         SOCIAL HISTORY:  Patient resides at home with wife. Patient ambulates with walker  Smoking history: Former smoker  Alcohol history: none        Objective:       Physical Exam:   Patient Vitals for the past 12 hrs:   Temp Pulse Resp BP SpO2   08/01/20 2226 98.2 °F (36.8 °C) 76 18 122/68 96 %   08/01/20 1901 98.4 °F (36.9 °C) 73 16 118/67 95 %     GEN APPEARANCE: Patient resting in bed in NAD  HEENT: Conjunctiva Clear. Dry mucosa  CVS: RRR, 3/6 systolic murmur best heard on left sternal border. LUNGS: CTAB; No Wheezes; No Rhonchi: No rales  ABD: Soft;  + Normoactive BS. Mild TTP RLQ, LLQ and suprapubic area. EXT: WWP, no edema   Skin exam: No gross lesions noted on exposed skin surfaces  MENTAL STATUS: Answers questions appropriately, responds to commands.    Neuro: Cranial nerve exam: EOMI, CN V sensory/motor intact, no facial asymmetry noted, patient able to hearl, uvula midline, able to move tongue left/right. No gross motor or sensory deficits. Motion of the LE limited due to pelvic pain. Data Review:   Recent Results (from the past 24 hour(s))   EKG, 12 LEAD, INITIAL    Collection Time: 08/01/20  7:40 PM   Result Value Ref Range    Ventricular Rate 76 BPM    Atrial Rate 76 BPM    P-R Interval 172 ms    QRS Duration 102 ms    Q-T Interval 394 ms    QTC Calculation (Bezet) 443 ms    Calculated P Axis 39 degrees    Calculated R Axis 54 degrees    Calculated T Axis 33 degrees    Diagnosis       Sinus rhythm with premature atrial complexes  Nonspecific ST abnormality  When compared with ECG of 16-DEC-2012 03:45,  premature atrial complexes are now present  ST elevation now present in Inferior leads     CBC WITH AUTOMATED DIFF    Collection Time: 08/01/20  8:03 PM   Result Value Ref Range    WBC 4.5 4.1 - 11.1 K/uL    RBC 4.29 4. 10 - 5.70 M/uL    HGB 12.1 12.1 - 17.0 g/dL    HCT 38.6 36.6 - 50.3 %    MCV 90.0 80.0 - 99.0 FL    MCH 28.2 26.0 - 34.0 PG    MCHC 31.3 30.0 - 36.5 g/dL    RDW 14.6 (H) 11.5 - 14.5 %    PLATELET 001 789 - 458 K/uL    MPV 10.5 8.9 - 12.9 FL    NRBC 0.0 0  WBC    ABSOLUTE NRBC 0.00 0.00 - 0.01 K/uL    NEUTROPHILS 71 32 - 75 %    LYMPHOCYTES 21 12 - 49 %    MONOCYTES 5 5 - 13 %    EOSINOPHILS 3 0 - 7 %    BASOPHILS 0 0 - 1 %    IMMATURE GRANULOCYTES 0 0.0 - 0.5 %    ABS. NEUTROPHILS 3.2 1.8 - 8.0 K/UL    ABS. LYMPHOCYTES 0.9 0.8 - 3.5 K/UL    ABS. MONOCYTES 0.2 0.0 - 1.0 K/UL    ABS. EOSINOPHILS 0.1 0.0 - 0.4 K/UL    ABS. BASOPHILS 0.0 0.0 - 0.1 K/UL    ABS. IMM.  GRANS. 0.0 0.00 - 0.04 K/UL    DF AUTOMATED     METABOLIC PANEL, COMPREHENSIVE    Collection Time: 08/01/20  8:03 PM   Result Value Ref Range    Sodium 130 (L) 136 - 145 mmol/L    Potassium 5.1 3.5 - 5.1 mmol/L    Chloride 101 97 - 108 mmol/L    CO2 15 (LL) 21 - 32 mmol/L    Anion gap 14 5 - 15 mmol/L    Glucose 85 65 - 100 mg/dL    BUN 53 (H) 6 - 20 MG/DL    Creatinine 2.55 (H) 0.70 - 1.30 MG/DL    BUN/Creatinine ratio 21 (H) 12 - 20      GFR est AA 30 (L) >60 ml/min/1.73m2    GFR est non-AA 25 (L) >60 ml/min/1.73m2    Calcium 8.8 8.5 - 10.1 MG/DL    Bilirubin, total 0.7 0.2 - 1.0 MG/DL    ALT (SGPT) 16 12 - 78 U/L    AST (SGOT) 20 15 - 37 U/L    Alk. phosphatase 97 45 - 117 U/L    Protein, total 7.5 6.4 - 8.2 g/dL    Albumin 3.7 3.5 - 5.0 g/dL    Globulin 3.8 2.0 - 4.0 g/dL    A-G Ratio 1.0 (L) 1.1 - 2.2     TROPONIN I    Collection Time: 08/01/20  8:03 PM   Result Value Ref Range    Troponin-I, Qt. <0.05 <0.05 ng/mL   SAMPLES BEING HELD    Collection Time: 08/01/20  8:06 PM   Result Value Ref Range    SAMPLES BEING HELD 1RED 1BLU     COMMENT        Add-on orders for these samples will be processed based on acceptable specimen integrity and analyte stability, which may vary by analyte. SAMPLES BEING HELD    Collection Time: 08/01/20 10:00 PM   Result Value Ref Range    SAMPLES BEING HELD 1red 1pst 1lav     COMMENT        Add-on orders for these samples will be processed based on acceptable specimen integrity and analyte stability, which may vary by analyte. Ct Low Ext Rt Wo Cont    Result Date: 8/1/2020  IMPRESSION: No fracture or dislocation. Assessment:     Active Problems:    Metabolic acidosis (7/0/5710)      Renal failure (8/1/2020)        Plan:     1. Anion Metabolic acidosis: could be related to renal failure  - Check lactic acid now  - IV fluids with NS at 100 ml/hr    2. Acute Renal failure: Creatinine 2.55 up from  1.4 back in July 2020. Could be prerenal.  -Given generalized weakness and fall will check a CK. IV fluids with normal saline 100 cc an hour  Monitor urine output  -Check UA.  CT of the abdomen/pelvis order. 3. bilateral lower extremity weakness: Chronic constipation. Feels it is exacerbated by arthritis. Denies present use of NSAIDs.  -CT of the right pelvis negative for fracture.  -Pain control with acetaminophen  -We will have PT eval given weakness and frequent falls.     4. Abdominal pain: Patient reported mild diffuse pain exacerbated by fall. Will obtain CT of the abdomen and pelvis  For further evaluation. 5.  History of Congestive heart failure/ h/o CAD s/p CABG: No acute issues.  -Patient appears dry on exam.  -We will hold Bumex and lisinopril for now given JULIO CESAR. -Strict in and outs.  Daily weights.  -Continue with beta-blocker and statin. 6.  Diabetes mellitus type 2:  Plan sliding-scale insulin and Accu-Chek before meals and at bedtime.  -Hold metformin due to JULIO CESAR. DVT prophylaxis: Heparin subcu  CODE STATUS: Full code  Plan discussed with patient and his wife who was at the bedside. All questions answered. FUNCTIONAL STATUS PRIOR TO HOSPITALIZATION Ambulatory with Use of Assistive Devices (including history of recent falls): Today.     Signed By: Juan Carlos Heath MD     August 1, 2020

## 2020-08-02 NOTE — PROGRESS NOTES
6:21 PM    Virtual reviewer communicated change to CM, which reflect outpatient observation order written prior to Written discharge order. Code 44 delivered to patient. CM met with the patient and provided to the patient the printed information about her outpatient observation status. The patient was given the flyer entitled, \"Medicare Outpatient Observation: Information & notification. \" All questions were answered, no additional discharge needs identified at this time and patient expects to return to their (home, assisted living facility, relatives home, etc.) after discharge.     KIM Merino/JANNETTE  Care Management

## 2020-08-02 NOTE — PROGRESS NOTES
[address] [facility] Adult  Hospitalist Group                                                                                          Hospitalist Progress Note  Nataly Noble MD  Answering service: [phone] OR 36 from in house phone        Date of Service:  2020  NAME:  Yael Miranda  :  1945  MRN:  [id number]      Admission Summary:   Yael Miranda is a 69 y.o. male with past medical history significant for CAD status post CABG, diabetes mellitus type 2, congestive heart failure, hypertension and arthritis presented emergency room after he had a fall today. Patient states that he was trying to get out of the truck when his leg gave out. He felt like his both his legs are weak. He does have arthritis and symptoms difficult for him to walk. He also had a fall yesterday because his legs were weak. He denies any head trauma loss of consciousness. Denies any symptoms such as shortness of breath, lightheadedness, palpitation, chest pain prior to the fall. He has been in his usual state of health, denies fever, denies recent infections, decreased fatigue lately. In the emergency room patient was found to be hemodynamically stable. Interval history / Subjective:   Doing ok today, denies any abdominal pain at this time, seems to have resolved.       Assessment & Plan:     JULIO CESAR stage 2 - Cr up to 2.55 from baseline of 1.0  - I and O   - avoid nephrotoxins, hold ACE  - Will give gentle hydration   - UA is bland  - Will check renal US  - CT abd/pelvis without obvious pathology or obstruction    AGMA - secondary to lactic acidosis  - Improved after IVF resuscitation    Type 2 diabetes - A1c 7.2%  - Continue SSI, POCT glucose checks and hypoglycemia protocol     H/o CHF (unknown systolic or diastolic) 2/2 CAD s/p CABG   - holding home bumex, ACEi  - strict I and O   - Monitor O2 saturations, and daily weights  - Continue BB, statin     B/l LE weakness - likely exacerbated by JULIO CESAR  - PT/OT consults    Abdominal pain now improved. - CT abdomen reviewed and negative  - PRN pain medications     Code status: FULL  DVT prophylaxis: heparin     Care Plan discussed with: Patient/Family  Anticipated Disposition: Home w/Family  Anticipated Discharge: 24 hours to 48 hours     Hospital Problems  Date Reviewed: 4/6/2012          Codes Class Noted POA    Metabolic acidosis O[facility]-90-YD: E87.2  ICD-9-CM: 276.2  8/1/2020 Unknown        Renal failure ICD-10-CM: N19  ICD-9-CM: 449  8/1/2020 Unknown                Review of Systems:   A comprehensive review of systems was negative except for that written in the HPI. Vital Signs:    Last 24hrs VS reviewed since prior progress note. Most recent are:  Visit Vitals  /69 (BP 1 Location: Right arm)   Pulse 76   Temp 97.6 °F (36.4 °C)   Resp 16   Wt 105.7 kg (233 lb)   SpO2 98%   BMI 35.43 kg/m²         Intake/Output Summary (Last 24 hours) at 8/2/2020 1443  Last data filed at 8/2/2020 0800  Gross per 24 hour   Intake 155 ml   Output 1350 ml   Net -1195 ml        Physical Examination:             Constitutional:  No acute distress, cooperative, pleasant    ENT:  Oral mucosa moist, oropharynx benign. Resp:  CTA bilaterally. No wheezing/rhonchi/rales. No accessory muscle use   CV:  Regular rhythm, normal rate, no murmurs, gallops, rubs, prior midsternal scar     GI:  Soft, non distended, non tender. normoactive bowel sounds, no hepatosplenomegaly     Musculoskeletal:  No edema, warm, 2+ pulses throughout    Neurologic:  Moves all extremities.   AAOx3, CN II-XII reviewed     Skin:  Good turgor, no rashes or ulcers       Data Review:    Review and/or order of clinical lab test  Review and/or order of tests in the radiology section of CPT  Review and/or order of tests in the medicine section of CPT      Labs:     Recent Labs     08/02/20 0217 08/01/20 2003   WBC 4.0* 4.5   HGB 11.2* 12.1   HCT 34.6* 38.6   * 165     Recent Labs     08/02/20 0217 08/01/20 2003   * 130*   K 4.9 5.1    101   CO2 20* 15*   BUN 52* 53*   CREA 2.33* 2.55*   GLU 95 85   CA 8.2* 8.8     Recent Labs     08/01/20 2003   ALT 16   AP 97   TBILI 0.7   TP 7.5   ALB 3.7   GLOB 3.8     No results for input(s): INR, PTP, APTT, INREXT in the last 72 hours. No results for input(s): FE, TIBC, PSAT, FERR in the last 72 hours. No results found for: FOL, RBCF   No results for input(s): PH, PCO2, PO2 in the last 72 hours.   Recent Labs     08/02/20  0217 08/01/20 2003     --    TROIQ  --  <0.05     Lab Results   Component Value Date/Time    Cholesterol, total 152 04/05/2012 12:25 PM    HDL Cholesterol 34 04/05/2012 12:25 PM    LDL, calculated 99.2 04/05/2012 12:25 PM    Triglyceride 94 04/05/2012 12:25 PM    CHOL/HDL Ratio 4.5 04/05/2012 12:25 PM     Lab Results   Component Value Date/Time    Glucose (POC) 183 (H) 04/06/2012 07:48 AM    Glucose (POC) 181 (H) 04/05/2012 09:16 PM    Glucose (POC) 200 (H) 04/05/2012 04:13 PM    Glucose (POC) 144 (H) 04/05/2012 11:31 AM    Glucose (POC) 185 (H) 04/05/2012 07:25 AM     Lab Results   Component Value Date/Time    Color YELLOW/STRAW 08/01/2020 10:22 PM    Appearance CLEAR 08/01/2020 10:22 PM    Specific gravity 1.015 08/01/2020 10:22 PM    pH (UA) 5.0 08/01/2020 10:22 PM    Protein Negative 08/01/2020 10:22 PM    Glucose Negative 08/01/2020 10:22 PM    Ketone Negative 08/01/2020 10:22 PM    Bilirubin Negative 08/01/2020 10:22 PM    Urobilinogen 1.0 08/01/2020 10:22 PM    Nitrites Negative 08/01/2020 10:22 PM    Leukocyte Esterase Negative 08/01/2020 10:22 PM    Epithelial cells FEW 08/01/2020 10:22 PM    Bacteria Negative 08/01/2020 10:22 PM    WBC 0-4 08/01/2020 10:22 PM    RBC 0-5 08/01/2020 10:22 PM         Medications Reviewed:     Current Facility-Administered Medications   Medication Dose Route Frequency    0.9% sodium chloride infusion  50 mL/hr IntraVENous CONTINUOUS    sodium chloride (NS) flush 5-40 mL  5-40 mL IntraVENous Q8H    sodium chloride (NS) flush 5-40 mL  5-40 mL IntraVENous PRN    acetaminophen (TYLENOL) tablet 650 mg  650 mg Oral Q6H PRN    Or    acetaminophen (TYLENOL) suppository 650 mg  650 mg Rectal Q6H PRN    polyethylene glycol (MIRALAX) packet 17 g  17 g Oral DAILY PRN    ondansetron (ZOFRAN ODT) tablet 4 mg  4 mg Oral Q8H PRN    Or    ondansetron (ZOFRAN) injection 4 mg  4 mg IntraVENous Q6H PRN     ______________________________________________________________________  EXPECTED LENGTH OF STAY: - - -  ACTUAL LENGTH OF STAY:          1                 Pardeep Kapoor MD

## 2020-08-03 ENCOUNTER — APPOINTMENT (OUTPATIENT)
Dept: ULTRASOUND IMAGING | Age: 75
End: 2020-08-03
Attending: INTERNAL MEDICINE
Payer: MEDICARE

## 2020-08-03 VITALS
TEMPERATURE: 99.1 F | BODY MASS INDEX: 34.96 KG/M2 | OXYGEN SATURATION: 95 % | DIASTOLIC BLOOD PRESSURE: 71 MMHG | WEIGHT: 229.94 LBS | SYSTOLIC BLOOD PRESSURE: 119 MMHG | HEART RATE: 66 BPM | RESPIRATION RATE: 22 BRPM

## 2020-08-03 LAB
ANION GAP SERPL CALC-SCNC: 7 MMOL/L (ref 5–15)
ATRIAL RATE: 76 BPM
BUN SERPL-MCNC: 32 MG/DL (ref 6–20)
BUN/CREAT SERPL: 22 (ref 12–20)
CALCIUM SERPL-MCNC: 8.9 MG/DL (ref 8.5–10.1)
CALCULATED P AXIS, ECG09: 39 DEGREES
CALCULATED R AXIS, ECG10: 54 DEGREES
CALCULATED T AXIS, ECG11: 33 DEGREES
CHLORIDE SERPL-SCNC: 108 MMOL/L (ref 97–108)
CO2 SERPL-SCNC: 22 MMOL/L (ref 21–32)
CREAT SERPL-MCNC: 1.45 MG/DL (ref 0.7–1.3)
DIAGNOSIS, 93000: NORMAL
GLUCOSE SERPL-MCNC: 117 MG/DL (ref 65–100)
MAGNESIUM SERPL-MCNC: 1.6 MG/DL (ref 1.6–2.4)
P-R INTERVAL, ECG05: 172 MS
PHOSPHATE SERPL-MCNC: 2.8 MG/DL (ref 2.6–4.7)
POTASSIUM SERPL-SCNC: 4.6 MMOL/L (ref 3.5–5.1)
Q-T INTERVAL, ECG07: 394 MS
QRS DURATION, ECG06: 102 MS
QTC CALCULATION (BEZET), ECG08: 443 MS
SODIUM SERPL-SCNC: 137 MMOL/L (ref 136–145)
VENTRICULAR RATE, ECG03: 76 BPM

## 2020-08-03 PROCEDURE — 97165 OT EVAL LOW COMPLEX 30 MIN: CPT

## 2020-08-03 PROCEDURE — 74011250636 HC RX REV CODE- 250/636: Performed by: INTERNAL MEDICINE

## 2020-08-03 PROCEDURE — 36415 COLL VENOUS BLD VENIPUNCTURE: CPT

## 2020-08-03 PROCEDURE — 80048 BASIC METABOLIC PNL TOTAL CA: CPT

## 2020-08-03 PROCEDURE — 97116 GAIT TRAINING THERAPY: CPT

## 2020-08-03 PROCEDURE — 76770 US EXAM ABDO BACK WALL COMP: CPT

## 2020-08-03 PROCEDURE — 99218 HC RM OBSERVATION: CPT

## 2020-08-03 PROCEDURE — 96372 THER/PROPH/DIAG INJ SC/IM: CPT

## 2020-08-03 PROCEDURE — 97161 PT EVAL LOW COMPLEX 20 MIN: CPT

## 2020-08-03 PROCEDURE — 97530 THERAPEUTIC ACTIVITIES: CPT

## 2020-08-03 PROCEDURE — 83735 ASSAY OF MAGNESIUM: CPT

## 2020-08-03 PROCEDURE — 84100 ASSAY OF PHOSPHORUS: CPT

## 2020-08-03 PROCEDURE — 74011250637 HC RX REV CODE- 250/637: Performed by: INTERNAL MEDICINE

## 2020-08-03 RX ORDER — CARVEDILOL 6.25 MG/1
6.25 TABLET ORAL 2 TIMES DAILY WITH MEALS
Status: DISCONTINUED | OUTPATIENT
Start: 2020-08-03 | End: 2020-08-03 | Stop reason: HOSPADM

## 2020-08-03 RX ORDER — ATORVASTATIN CALCIUM 40 MG/1
80 TABLET, FILM COATED ORAL DAILY
Status: DISCONTINUED | OUTPATIENT
Start: 2020-08-03 | End: 2020-08-03 | Stop reason: HOSPADM

## 2020-08-03 RX ORDER — PANTOPRAZOLE SODIUM 40 MG/1
40 TABLET, DELAYED RELEASE ORAL DAILY
Status: DISCONTINUED | OUTPATIENT
Start: 2020-08-03 | End: 2020-08-03 | Stop reason: HOSPADM

## 2020-08-03 RX ORDER — ASPIRIN 81 MG/1
81 TABLET ORAL DAILY
Status: DISCONTINUED | OUTPATIENT
Start: 2020-08-03 | End: 2020-08-03 | Stop reason: HOSPADM

## 2020-08-03 RX ADMIN — ASPIRIN 81 MG: 81 TABLET, COATED ORAL at 09:08

## 2020-08-03 RX ADMIN — PANTOPRAZOLE SODIUM 40 MG: 40 TABLET, DELAYED RELEASE ORAL at 09:09

## 2020-08-03 RX ADMIN — CARVEDILOL 6.25 MG: 6.25 TABLET, FILM COATED ORAL at 09:08

## 2020-08-03 RX ADMIN — HEPARIN SODIUM 5000 UNITS: 5000 INJECTION, SOLUTION INTRAVENOUS; SUBCUTANEOUS at 09:09

## 2020-08-03 RX ADMIN — SODIUM CHLORIDE 50 ML/HR: 900 INJECTION, SOLUTION INTRAVENOUS at 04:06

## 2020-08-03 RX ADMIN — ATORVASTATIN CALCIUM 80 MG: 40 TABLET, FILM COATED ORAL at 09:08

## 2020-08-03 RX ADMIN — HEPARIN SODIUM 5000 UNITS: 5000 INJECTION, SOLUTION INTRAVENOUS; SUBCUTANEOUS at 01:21

## 2020-08-03 RX ADMIN — ACETAMINOPHEN 650 MG: 325 TABLET ORAL at 09:08

## 2020-08-03 NOTE — PROGRESS NOTES
Transition of Care Plan  Observation status    Disposition  Home with wife and adult daughter    Transportation  Wife  [facility]   Order received  Referrals sent via [employer]:  [facility] [phone]  [facility] [phone]   NO  [facility] [phone]   NO  [facility] [address] [phone] Can service in 5 days  [phone]      Cm met with patient    He is in agreement with home health  The Plan for Transition of Care is related to the following treatment goals: home health    The Patient was provided with a choice of provider and agrees   with the discharge plan. [x] Yes [] No    Freedom of choice list was provided with basic dialogue that supports the patient's individualized plan of care/goals, treatment preferences and shares the quality data associated with the providers. [x] Yes [] No    Freedom of Choice letter signed. Patient has not preference-- just wants the agency to be in network with insurance [employer] --see list above    [facility] service patient-- name and number placed on discharge instructions and patient advised to call agency if not contacted by noon the day after discharge to inquire as to the day and time of initial visit    Family to transport home   Patient lives in one level home with wife and daughter. Care Management Interventions  PCP Verified by CM:  Yes  Mode of Transport at Discharge: (car)  Transition of Care Consult (CM Consult): Discharge Planning  Discharge Durable Medical Equipment: No  Physical Therapy Consult: Yes  Occupational Therapy Consult: Yes  Speech Therapy Consult: No  Current Support Network: Lives with Spouse  Confirm Follow Up Transport: Family  The Plan for Transition of Care is Related to the Following Treatment Goals : home health  The Patient and/or Patient Representative was Provided with a Choice of Provider and Agrees with the Discharge Plan?: Yes  Freedom of Choice List was Provided with Basic Dialogue that Supports the Patient's Individualized Plan of Care/Goals, Treatment Preferences and Shares the Quality Data Associated with the Providers?: Yes  Discharge Location  Discharge Placement: Home with home health

## 2020-08-03 NOTE — DISCHARGE SUMMARY
Discharge Summary       PATIENT ID: Bharat Rowland  MRN: [id number]   YOB: 1945    DATE OF ADMISSION: 8/1/2020  7:11 PM    DATE OF DISCHARGE: 08/03/2020   PRIMARY CARE PROVIDER: Carola Rivera MD   DISCHARGING PROVIDER: Ever Blanc MD    To contact this individual call [phone] and ask the  to page. If unavailable ask to be transferred the Adult Hospitalist Department. CONSULTATIONS: IP CONSULT TO HOSPITALIST    PROCEDURES/SURGERIES: * No surgery found *    ADMITTING [address] [place] [address] COURSE:   JULIO CESAR stage 2 - improved  Dehydration    Per H and P   Bharat Rowland is a 69 y.o. male with past medical history significant for CAD status post CABG, diabetes mellitus type 2, congestive heart failure, hypertension and arthritis presented emergency room after he had a fall today. Patient states that he was trying to get out of the truck when his leg gave out. He felt like his both his legs are weak. He does have arthritis and symptoms difficult for him to walk. He also had a fall yesterday because his legs were weak. He denies any head trauma loss of consciousness. Denies any symptoms such as shortness of breath, lightheadedness, palpitation, chest pain prior to the fall. He has been in his usual state of health, denies fever, denies recent infections, decreased fatigue lately. In the emergency room patient was found to be hemodynamically stable. Blood test revealed a creatinine of 2.5 as well as acidosis. Patient denies any recent medication, he denies using NSAIDs for arthritis. He has been compliant with his medication including the metformin and Bumex. He denies decreased p.o. intake, new onset diarrhea, vomiting, changes in the urine output. Hospitalist was consulted for further management of acute kidney injury. Patient was admitted to the hosptial, diuretics were held and he was started on low dose IVF with improvement in his creatinine.  Patient will be discharged home with close outpatient follow up, off diuretics. DISCHARGE DIAGNOSES / PLAN:      JULIO CESAR stage 2 - Cr up to 2.55 from baseline of 1.0  - I and O   - avoid nephrotoxins, hold ACE  - Will give gentle hydration   - UA is bland  - Will check renal US  - CT abd/pelvis without obvious pathology or obstruction     AGMA - secondary to lactic acidosis  - Improved after IVF resuscitation     Type 2 diabetes - A1c 7.2%  - Continue SSI, POCT glucose checks and hypoglycemia protocol      H/o CHF (unknown systolic or diastolic) 2/2 CAD s/p CABG   - holding home bumex, ACEi  - strict I and O   - Monitor O2 saturations, and daily weights  - Continue BB, statin      B/l LE weakness - likely exacerbated by JULIO CESAR  - PT/OT consulted, hoping patient will be able to ambulate back home with assistance from his wife.      Abdominal pain now improved. - CT abdomen reviewed and negative  - PRN pain medications      ADDITIONAL CARE RECOMMENDATIONS:   - Please take all medications as prescribed. Note changes as below. ** Hold your diuretics (bumex), and your ACEi (lisinopril)  ** you need to follow up closely with your PCP or cardiologist to determine when to resume your diuretics. - Please make sure to follow up with your primary care physician within 1-2 weeks of discharge for hospital follow up  - Please get up slowly from a seated or laying position, avoid falls. - Avoid tobacco, alcohol and other illicit drug use.          PENDING TEST RESULTS:   At the time of discharge the following test results are still pending: None    FOLLOW UP APPOINTMENTS:    Follow-up Information     Follow up With Specialties Details Why Contact Info    Ronn Orozco MD Family Medicine In 1 week Please call to make a follow up appointment this week  [address] [phone]      Cardiology   Please call your outpatient cardiologist and make an appointment               DIET: Resume previous diet    ACTIVITY: PT/OT per Home Health    WOUND CARE: None    EQUIPMENT needed: None      DISCHARGE MEDICATIONS:  Current Discharge Medication List      CONTINUE these medications which have NOT CHANGED    Details   allopurinoL (ZYLOPRIM) 100 mg tablet Take 100 mg by mouth daily. potassium chloride SR (KLOR-CON 10) 10 mEq tablet Take 10 mEq by mouth.      pantoprazole (PROTONIX) 40 mg tablet Take 40 mg by mouth daily. aspirin delayed-release 81 mg tablet Take 81 mg by mouth daily. atorvastatin (LIPITOR) 40 mg tablet Take 80 mg by mouth daily. carvedilol (COREG) 6.25 mg tablet Take 1 Tab by mouth two (2) times daily (with meals). Qty: 60 Tab, Refills: 11      Insulin Lisp & Lisp Prot, Hum, (HUMALOG PEN) 100 unit/mL (75-25) flexpen 50 Units by SubCUTAneous route two (2) times a day. predniSONE (DELTASONE) 10 mg tablet Take 20 mg by mouth daily (with breakfast). nitroglycerin (NITROSTAT) 0.4 mg SL tablet 1 Tab by SubLINGual route every five (5) minutes as needed for Chest Pain. Qty: 1 Bottle, Refills: 0         STOP taking these medications       lisinopriL (PRINIVIL, ZESTRIL) 5 mg tablet Comments:   Reason for Stopping:         bumetanide (BUMEX) 2 mg tablet Comments:   Reason for Stopping:         metFORMIN (GLUCOPHAGE) 500 mg tablet Comments:   Reason for Stopping:         prasugrel (EFFIENT) 10 mg tablet Comments:   Reason for Stopping:         indomethacin (INDOCIN) 50 mg capsule Comments:   Reason for Stopping:         furosemide (LASIX) 40 mg tablet Comments:   Reason for Stopping:         trandolapril (MAVIK) 1 mg tablet Comments:   Reason for Stopping:         rosuvastatin (CRESTOR) 10 mg tablet Comments:   Reason for Stopping:         furosemide (LASIX) 40 mg tablet Comments:   Reason for Stopping:                 NOTIFY YOUR PHYSICIAN FOR ANY OF THE FOLLOWING:   Fever over 101 degrees for 24 hours.    Chest pain, shortness of breath, fever, chills, nausea, vomiting, diarrhea, change in mentation, falling, weakness, bleeding. Severe pain or pain not relieved by medications. Or, any other signs or symptoms that you may have questions about.     DISPOSITION:  X Home With:   OT  PT X HH  RN       Long term SNF/Inpatient Rehab    Independent/assisted living    Hospice    Other:       PATIENT CONDITION AT DISCHARGE:     Functional status    Poor     Deconditioned    X Independent      Cognition   X  Lucid     Forgetful     Dementia      Catheters/lines (plus indication)    Shankar     PICC     PEG    X None      Code status    X Full code     DNR      PHYSICAL EXAMINATION AT DISCHARGE:  Visit Vitals  /74   Pulse 70   Temp 98 °F (36.7 °C)   Resp 20   Wt 104.3 kg (229 lb 15 oz)   SpO2 97%   BMI 34.96 kg/m²     Gen: NAD, awake in bed  HEENT: NC/AT, sclera anicteric, PERRL, EOMI  CV: RRR no m/r/g, normal S1 and S2, no pedal edema   Resp: CTA b/l no increased work of breathing, no wheezing or rhonchi, speaking in full sentences   Abd: NT/ND, normal bowel sounds, no rebound or guarding  Ext: 2+ pulses, no edema  Skin: No rashes or lesions      CHRONIC MEDICAL DIAGNOSES:  Problem List as of 8/3/2020 Date Reviewed: 4/6/2012          Codes Class Noted - Resolved    ACS (acute coronary syndrome) ([facility] ICD-10-CM: I24.9  ICD-9-CM: 411.1  2/17/2011 - Present        DM (diabetes mellitus), type 2, uncontrolled (HCC) (Chronic) ICD-10-CM: E11.65  ICD-9-CM: 250.02  2/17/2011 - Present        CAD (coronary artery disease) of artery bypass graft (Chronic) ICD-10-CM: X92.553  ICD-9-CM: 414.05  2/17/2011 - Present        HTN (hypertension) (Chronic) ICD-10-CM: I10  ICD-9-CM: 401.9  2/17/2011 - Present        Other and unspecified hyperlipidemia (Chronic) ICD-10-CM: E78.5  ICD-9-CM: 272.4  2/17/2011 - Present        Metabolic acidosis YET-62-HS: E87.2  ICD-9-CM: 276.2  8/1/2020 - Present        Renal failure ICD-10-CM: N19  ICD-9-CM: 279  8/1/2020 - Present        Mitral regurgitation ICD-10-CM: I34.0  ICD-9-CM: 424.0  4/5/2012 - Present        Aortic stenosis ICD-10-CM: I35.0  ICD-9-CM: 424.1  4/5/2012 - Present        Acute MI, inferolateral wall (HCC) ICD-10-CM: I21.19  ICD-9-CM: 410.20  4/5/2012 - Present        S/P CABG (coronary artery bypass graft) ICD-10-CM: Z95.1  ICD-9-CM: V45.81  4/5/2012 - Present        Chest pain, unspecified ICD-10-CM: R07.9  ICD-9-CM: 786.50  7/20/2011 - Present              Greater than 30 minutes were spent with the patient on counseling and coordination of care    Signed:   Quita Dave MD  8/3/2020  10:32 AM

## 2020-08-03 NOTE — PROGRESS NOTES
Orders received, chart reviewed and patient evaluated by physical therapy. Pending progression with skilled acute physical therapy, recommend:  Physical therapy at least 2 days/week in the home     Recommend with nursing patient to complete as able in order to maintain strength, endurance and independence: OOB to chair 3x/day with assist X1 and ambulating with assist x 1 using the walker. Thank you for your assistance. Full evaluation to follow.  Stephanie Cuello, PT      Vitals:    08/03/20 1139 08/03/20 1143 08/03/20 1150 08/03/20 1213   BP: 160/76 167/85 132/73 119/71   BP 1 Location: Left arm Left arm Left arm Left arm   BP Patient Position: Supine Sitting Standing Sitting;Post activity   Pulse: 65 71 80 73   Resp:       Temp:       SpO2: on room air 98% 94% 92% 95%

## 2020-08-03 NOTE — PROGRESS NOTES
Problem: Falls - Risk of  Goal: *Absence of Falls  Description: Document Haven Villalba Fall Risk and appropriate interventions in the flowsheet. Outcome: Progressing Towards Goal  Note: Fall Risk Interventions:  Mobility Interventions: Patient to call before getting OOB              Elimination Interventions: Bed/chair exit alarm    History of Falls Interventions: Door open when patient unattended, Room close to nurse's station         Problem: Patient Education: Go to Patient Education Activity  Goal: Patient/Family Education  Outcome: Progressing Towards Goal     Problem: Pressure Injury - Risk of  Goal: *Prevention of pressure injury  Description: Document John Scale and appropriate interventions in the flowsheet.   Outcome: Progressing Towards Goal  Note: Pressure Injury Interventions:  Sensory Interventions: Assess changes in LOC         Activity Interventions: PT/OT evaluation    Mobility Interventions: PT/OT evaluation    Nutrition Interventions: Document food/fluid/supplement intake

## 2020-08-03 NOTE — PROGRESS NOTES
Problem: Falls - Risk of  Goal: *Absence of Falls  Description: Document Devere [place] Fall Risk and appropriate interventions in the flowsheet. Outcome: Progressing Towards Goal  Note: Fall Risk Interventions:  Mobility Interventions: Communicate number of staff needed for ambulation/transfer, Patient to call before getting OOB, PT Consult for mobility concerns, Utilize walker, cane, or other assistive device, PT Consult for assist device competence              Elimination Interventions: Call light in reach, Toileting schedule/hourly rounds, Patient to call for help with toileting needs    History of Falls Interventions: Bed/chair exit alarm, Door open when patient unattended, Investigate reason for fall, Room close to nurse's station         Problem: Patient Education: Go to Patient Education Activity  Goal: Patient/Family Education  Outcome: Progressing Towards Goal     Problem: Pressure Injury - Risk of  Goal: *Prevention of pressure injury  Description: Document John Scale and appropriate interventions in the flowsheet. Outcome: Progressing Towards Goal  Note: Pressure Injury Interventions:  Sensory Interventions: Avoid rigorous massage over bony prominences, Discuss PT/OT consult with provider, Keep linens dry and wrinkle-free, Maintain/enhance activity level, Turn and reposition approx. every two hours (pillows and wedges if needed), Pressure redistribution bed/mattress (bed type)         Activity Interventions: Increase time out of bed, Pressure redistribution bed/mattress(bed type), PT/OT evaluation    Mobility Interventions: HOB 30 degrees or less, Pressure redistribution bed/mattress (bed type), PT/OT evaluation, Turn and reposition approx.  every two hours(pillow and wedges)    Nutrition Interventions: Document food/fluid/supplement intake                     Problem: Patient Education: Go to Patient Education Activity  Goal: Patient/Family Education  Outcome: Progressing Towards Goal
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
## 2020-08-03 NOTE — PROGRESS NOTES
Problem: Mobility Impaired (Adult and Pediatric)  Goal: *Acute Goals and Plan of Care (Insert Text)  Description: FUNCTIONAL STATUS PRIOR TO ADMISSION: Patient was modified independent using a single point cane most of the time but used a walker at times for functional mobility. When asked, he reports 4 falls in the last 12 months (2 just prior to admission)    HOME SUPPORT PRIOR TO ADMISSION: The patient lived with his wife, daughter and grandson but did not require assist.    Physical Therapy Goals  Initiated 8/3/2020  1. Patient will move from supine to sit and sit to supine , scoot up and down, and roll side to side in bed with independence within 7 day(s). 2.  Patient will transfer from bed to chair and chair to bed with modified independence using the least restrictive device within 7 day(s). 3.  Patient will perform sit to stand with modified independence within 7 day(s). 4.  Patient will ambulate with modified independence for 300 feet with the least restrictive device within 7 day(s). 5.  Patient will ascend/descend 3 stairs with one  handrail(s) with modified independence within 7 day(s). 8/3/2020 1254 by Marian Hernandez  Outcome: Progressing Towards Goal   PHYSICAL THERAPY EVALUATION  Patient: Corey Mclain (12 y.o. male)  Date: 8/3/2020  Primary Diagnosis: Metabolic acidosis [C63.9]  Renal failure [B22]  Metabolic acidosis [W64.6]  Renal failure [N19]        Precautions:   Fall, Bed Alarm    ASSESSMENT  Based on the objective data described below, the patient presents with impaired standing balance requiring the use of a walker for safe amb (primarily uses a cane at baseline) and orthostatic hypotension but was not symptomatic (see chart and subjective below). He was admitted after two falls. Per MD note LE weakness likely exacerbated by JULIO CESAR. LEs tested at 5/5 except for hip flexion at 2-/5 bilaterally. When amb note heavy reliance on the walker.  Was able to clear him for discharge and HHPT recommended, but did write goals in the event pt is not discharged today as planned. Of note, pt has a pair of size 14 diabetic shoes here. They are a little long for him, although he did amb in them during the eval without difficulty. I instructed him: to wear his size 13 shoes (they are at home at this time) and to use his walker not his cane when he gets home and to let the HHPT address weaning him off of the walker. He verbalized understanding. Current Level of Function Impacting Discharge (mobility/balance): impaired standing balance, up to contact guard provided. Functional Outcome Measure: The patient scored 20/28 on the Tinetti outcome measure which is indicative of moderate fall risk. .          Vitals:     08/03/20 1139 08/03/20 1143 08/03/20 1150 08/03/20 1213   BP: 160/76 167/85 132/73 119/71   BP 1 Location: Left arm Left arm Left arm Left arm   BP Patient Position: Supine Sitting Standing Sitting;Post activity   Pulse: 65 71 80 73   Resp:           Temp:           SpO2: on room air 98% 94% 92% 95%                          Other factors to consider for discharge: history of falls (2 just prior to admission), arthritis, JULIO CESAR, HTN, DM        Patient will benefit from skilled therapy intervention to address the above noted impairments. PLAN :  Recommendations and Planned Interventions: bed mobility training, transfer training, gait training, therapeutic exercises, patient and family training/education, and therapeutic activities      Frequency/Duration: Patient will be followed by physical therapy:  3 times a week to address goals.     Recommendation for discharge: (in order for the patient to meet his/her long term goals)  Physical therapy at least 2 days/week in the home     This discharge recommendation:  A follow-up discussion with the attending provider and/or case management is planned    IF patient discharges home will need the following DME: none         SUBJECTIVE:   Patient stated I've mostly been in the bed, since admission    OBJECTIVE DATA SUMMARY:   Consult received, chart reviewed, pt cleared by nursing  HISTORY:    Past Medical History:   Diagnosis Date    Arthritis     CAD (coronary artery disease)     CABG    Diabetes ([facility] [address]     Heart failure ([facility]     Hypertension     Other ill-defined conditions(799.89)     kidney stones 1990's     Past Surgical History:   Procedure Laterality Date    CARDIAC SURG PROCEDURE UNLIST      endarterectomy, CABG    HX CORONARY ARTERY BYPASS GRAFT      HX ORTHOPAEDIC      right knee surgery 2009       Personal factors and/or comorbidities impacting plan of care: history of falls (2 just prior to admission), arthritis, JULIO CESAR, HTN, DM     Home Situation  Home Environment: Private residence  # Steps to Enter: 2  Rails to Enter: Yes  Hand Rails : Bilateral  One/Two Story Residence: One story  Living Alone: No  Support Systems: Spouse/Significant Other/Partner, Family member(s)  Patient Expects to be Discharged to[de-identified] Private residence  Current DME Used/Available at Home: Walker, rolling, [place], straight(diabetic shoes)    EXAMINATION/PRESENTATION/DECISION MAKING:   Critical Behavior:  Neurologic State: Alert  Orientation Level: Oriented X4        Hearing: Auditory  Auditory Impairment: Hard of hearing, bilateral  Skin:  refer to MD and nursing notes  Edema: none noted  Range Of Motion:  AROM: Generally decreased, functional                       Strength:    Strength: Generally decreased, functional         Bilateral hip flexors 2-/5, otherwise WFL           Tone & Sensation:                  Sensation: Intact               Coordination:     Vision:      Functional Mobility:  Bed Mobility:     Supine to Sit: Modified independent        Transfers:  Sit to Stand: Contact guard assistance  Stand to Sit: Contact guard assistance                       Balance:   Sitting: Intact; Without support  Standing: Impaired  Standing - Static: Constant support;Good  Standing - Dynamic : Constant support;Good  Ambulation/Gait Training:  Distance (ft): 80 Feet (ft)(with one seated rest break)  Assistive Device: Gait belt;Walker, rolling  Ambulation - Level of Assistance: Contact guard assistance        Gait Abnormalities: Decreased step clearance        Base of Support: Widened     Speed/Kamille: Slow;Pace decreased (<100 feet/min)  Step Length: Left shortened;Right shortened                     Stairs:  Number of Stairs Trained: 4  Stairs - Level of Assistance: Contact guard assistance;Assist X2   Rail Use: Right     Therapeutic Exercises:       Functional Measure:  Tinetti test:    Sitting Balance: 1  Arises: 1  Attempts to Rise: 2  Immediate Standing Balance: 1  Standing Balance: 1  Nudged: 2  Eyes Closed: 1  Turn 360 Degrees - Continuous/Discontinuous: 1  Turn 360 Degrees - Steady/Unsteady: 1  Sitting Down: 1  Balance Score: 12 Balance total score  Indication of Gait: 1  R Step Length/Height: 1  L Step Length/Height: 1  R Foot Clearance: 1  L Foot Clearance: 1  Step Symmetry: 1  Step Continuity: 1  Path: 1  Trunk: 0  Walking Time: 0  Gait Score: 8 Gait total score  Total Score: 20/28 Overall total score         Tinetti Tool Score Risk of Falls  <19 = High Fall Risk  19-24 = Moderate Fall Risk  25-28 = Low Fall Risk  Tinetti ME. Performance-Oriented Assessment of Mobility Problems in Elderly Patients. [address] 67; [id number].  (Scoring Description: PT Bulletin Feb. 10, 1993)    Older adults: David Haddad et al, 2009; n = 1000 [facility] elderly evaluated with ABC, KAREN, ADL, and IADL)  · Mean KAREN score for males aged 69-68 years = 26.21(3.40)  · Mean KAREN score for females age 69-68 years = 25.16(4.30)  · Mean KAREN score for males over 80 years = 23.29(6.02)  · Mean KAREN score for females over 80 years = 17.20(8.32)           Physical Therapy Evaluation Charge Determination   History Examination Presentation Decision-Making   HIGH Complexity :3+ comorbidities / personal factors will impact the outcome/ POC  MEDIUM Complexity : 3 Standardized tests and measures addressing body structure, function, activity limitation and / or participation in recreation  MEDIUM Complexity : Evolving with changing characteristics  LOW Complexity : FOTO score of       Based on the above components, the patient evaluation is determined to be of the following complexity level: LOW     Pain Rating:  None rated    Activity Tolerance:   See assessment above  Please refer to the flowsheet for vital signs taken during this treatment. After treatment patient left in no apparent distress:   Sitting in chair, Call bell within reach, and Bed / chair alarm activated    COMMUNICATION/EDUCATION:   The patients plan of care was discussed with: Occupational therapist, Registered nurse, and Case management. Fall prevention education was provided and the patient/caregiver indicated understanding., Patient/family have participated as able in goal setting and plan of care. , and Patient/family agree to work toward stated goals and plan of care.     Thank you for this referral.  Lis Ortega   Time Calculation: 58 mins

## 2020-08-03 NOTE — DISCHARGE INSTRUCTIONS
Dear Anoop Turk,    Thank you for choosing [address] [place] [address] for your healthcare needs. We strive to provide EXCELLENT care to you and your family. In an effort to explain clearly why you were here in the hospital, I've also written a very brief summary below. Other details including formal diagnosis, medication changes, and follow up appointment recommendations can also be found in this packet. You were admitted for weakness due to hypotension and acute kidney injury for which you were started on IV fluids. You also received care from specialist physicians in the following specialties:  IP CONSULT TO HOSPITALIST    Here are the updates to your medication list:  ** Hold your diuretics (bumex), and your ACEi (lisinopril)  ** you need to follow up closely with your PCP or cardiologist to determine when to resume your diuretics. Remember that it is important for you to take your medications exactly as they are prescribed. It is helpful to keep a list of your medication with the names, dosages, and times to be taken in your wallet. Additionally,   - Please make sure to follow up with your primary care physician within 1-2 weeks of discharge for hospital follow up  - Please get up slowly from a seated or laying position, avoid falls. - Avoid tobacco, alcohol and other illicit drug use. Make sure to also see your primary care doctor for follow-up. Bring these papers with you and be sure to review your medication list with your doctor. I cannot stress the importance of follow up enough. I've included the information for your follow-up appointments below:     Follow-up Information     Follow up With Specialties Details Why [address], Yobany Burns MD Family Medicine In 1 week Please call to make a follow up appointment this week  [address] [phone]      Cardiology   Please call your outpatient cardiologist and make an appointment            Should you have any fever over 101 degrees for 24 hours, chest pain, shortness of breath, fever, chills, nausea, vomiting, diarrhea, change in mentation, falling, weakness, bleeding, or worsening pain, please seek medical attention immediately. Finally, as your discharging physician, you may be receiving a survey regarding my care. I would greatly value and appreciate your input in the survey as we strive for excellence. If you have any questions, I can be reached at [phone].   Thank you so much again for allowing me to care for you at [facility].    Respectfully yours,  Carlos Milian MD

## 2020-08-03 NOTE — PROGRESS NOTES
Hospital follow-up PCP transitional care appointment has been scheduled with Dr. Chau Elmore for Friday, 8/7/20 at 10:15 a.m. Pending patient discharge.   Thomas Mott, Care Management Specialist.

## 2020-08-04 ENCOUNTER — PATIENT OUTREACH (OUTPATIENT)
Dept: CASE MANAGEMENT | Age: 75
End: 2020-08-04

## 2020-08-04 NOTE — PROGRESS NOTES
Patient contacted regarding recent discharge and COVID-19 risk. Discussed COVID-19 related testing which was not done at this time. Test results were not done. Patient informed of results, if available? n/a    Care Transition Nurse/ Ambulatory Care Manager contacted the patient by telephone to perform post discharge assessment. Verified name and  with patient as identifiers. Patient has following risk factors of: renal failure, S/P CABAG. CTN/ACM reviewed discharge instructions, medical action plan and red flags related to discharge diagnosis. Reviewed and educated them on any new and changed medications related to discharge diagnosis. Advised obtaining a 90-day supply of all daily and as-needed medications. Advance Care Planning:   Does patient have an Advance Directive: decision makers updated     Education provided regarding infection prevention, and signs and symptoms of COVID-19 and when to seek medical attention with patient who verbalized understanding. Discussed exposure protocols and quarantine from [address] you at higher risk for severe illness  and given an opportunity for questions and concerns. The patient agrees to contact the COVID-19 hotline [phone] or PCP office for questions related to their healthcare. CTN/ACM provided contact information for future reference. From CDC: Are you at higher risk for severe illness?  Wash your hands often.  Avoid close contact (6 feet, which is about two arm lengths) with people who are sick.  Put distance between yourself and other people if COVID-19 is spreading in your community.  Clean and disinfect frequently touched surfaces.  Avoid all cruise travel and non-essential air travel.  Call your healthcare professional if you have concerns about COVID-19 and your underlying condition or if you are sick.     For more information on steps you can take to protect yourself, see CDC's How to Protect Yourself Patient/family/caregiver given information for [employer] and agrees to enroll no  Patient's preferred e-mail:    Patient's preferred phone number:  Based on Loop alert triggers, patient will be contacted by nurse care manager for worsening symptoms. Plan for follow-up call in 7-14 days based on severity of symptoms and risk factors. Spouse to call for follow up appointment.     Sabino Paulino MD Family Medicine In 1 week Please call to make a follow up appointment this week  [address] [phone]

## 2020-08-18 ENCOUNTER — PATIENT OUTREACH (OUTPATIENT)
Dept: CASE MANAGEMENT | Age: 75
End: 2020-08-18

## 2020-08-18 NOTE — PROGRESS NOTES
Patient resolved from Transition of Care episode on 8/18/2020. Discussed COVID-19 related testing which was not done at this time. Test results were not done. Patient/family has been provided the following resources and education related to COVID-19:                         Signs, symptoms and red flags related to COVID-19            CDC exposure and quarantine guidelines            [employer] exposure contact - [phone]            Contact for their local Department of Health                 Patient currently reports: no current symptoms    No further outreach scheduled with this CTN/ACM/LPN/HC/ MA. Episode of Care resolved. Patient has this CTN/ACM/LPN/HC/MA contact information if future needs arise. Follow ups:  -Dr. Myra Villa follow up attended on 8/7/2020    -Patient will call today to set up cards appt. (not sure from records who he sees). Mr. Mary Schaefer reports has been in contact with office but has not scheduled an appt.     -Urology follow up on [email or web address]

## 2021-08-14 ENCOUNTER — APPOINTMENT (OUTPATIENT)
Dept: CT IMAGING | Age: 76
DRG: 069 | End: 2021-08-14
Attending: STUDENT IN AN ORGANIZED HEALTH CARE EDUCATION/TRAINING PROGRAM
Payer: MEDICARE

## 2021-08-14 ENCOUNTER — HOSPITAL ENCOUNTER (INPATIENT)
Age: 76
LOS: 3 days | Discharge: HOME | DRG: 069 | End: 2021-08-17
Attending: STUDENT IN AN ORGANIZED HEALTH CARE EDUCATION/TRAINING PROGRAM | Admitting: HOSPITALIST
Payer: MEDICARE

## 2021-08-14 DIAGNOSIS — E87.1 HYPONATREMIA: ICD-10-CM

## 2021-08-14 DIAGNOSIS — R29.810 FACIAL DROOP: ICD-10-CM

## 2021-08-14 DIAGNOSIS — R29.898 RIGHT LEG WEAKNESS: ICD-10-CM

## 2021-08-14 DIAGNOSIS — R20.0 RIGHT FACIAL NUMBNESS: Primary | ICD-10-CM

## 2021-08-14 DIAGNOSIS — N17.9 ACUTE KIDNEY INJURY (HCC): ICD-10-CM

## 2021-08-14 PROBLEM — I63.9 CVA (CEREBRAL VASCULAR ACCIDENT) (HCC): Status: ACTIVE | Noted: 2021-08-14

## 2021-08-14 LAB
ALBUMIN SERPL-MCNC: 3.6 G/DL (ref 3.5–5)
ALBUMIN/GLOB SERPL: 1 {RATIO} (ref 1.1–2.2)
ALP SERPL-CCNC: 113 U/L (ref 45–117)
ALT SERPL-CCNC: 14 U/L (ref 12–78)
ANION GAP SERPL CALC-SCNC: 3 MMOL/L (ref 5–15)
AST SERPL-CCNC: 18 U/L (ref 15–37)
BASOPHILS # BLD: 0 K/UL (ref 0–0.1)
BASOPHILS NFR BLD: 0 % (ref 0–1)
BILIRUB SERPL-MCNC: 0.9 MG/DL (ref 0.2–1)
BUN SERPL-MCNC: 54 MG/DL (ref 6–20)
BUN/CREAT SERPL: 19 (ref 12–20)
CALCIUM SERPL-MCNC: 8.8 MG/DL (ref 8.5–10.1)
CHLORIDE SERPL-SCNC: 102 MMOL/L (ref 97–108)
CO2 SERPL-SCNC: 26 MMOL/L (ref 21–32)
COMMENT, HOLDF: NORMAL
CREAT SERPL-MCNC: 2.8 MG/DL (ref 0.7–1.3)
DIFFERENTIAL METHOD BLD: ABNORMAL
EOSINOPHIL # BLD: 0.2 K/UL (ref 0–0.4)
EOSINOPHIL NFR BLD: 4 % (ref 0–7)
ERYTHROCYTE [DISTWIDTH] IN BLOOD BY AUTOMATED COUNT: 16.4 % (ref 11.5–14.5)
GLOBULIN SER CALC-MCNC: 3.6 G/DL (ref 2–4)
GLUCOSE BLD STRIP.AUTO-MCNC: 100 MG/DL (ref 65–117)
GLUCOSE BLD STRIP.AUTO-MCNC: 122 MG/DL (ref 65–117)
GLUCOSE SERPL-MCNC: 111 MG/DL (ref 65–100)
HCT VFR BLD AUTO: 34.3 % (ref 36.6–50.3)
HGB BLD-MCNC: 11.1 G/DL (ref 12.1–17)
IMM GRANULOCYTES # BLD AUTO: 0 K/UL (ref 0–0.04)
IMM GRANULOCYTES NFR BLD AUTO: 0 % (ref 0–0.5)
INR PPP: 1.1 (ref 0.9–1.1)
LYMPHOCYTES # BLD: 0.7 K/UL (ref 0.8–3.5)
LYMPHOCYTES NFR BLD: 17 % (ref 12–49)
MCH RBC QN AUTO: 29.8 PG (ref 26–34)
MCHC RBC AUTO-ENTMCNC: 32.4 G/DL (ref 30–36.5)
MCV RBC AUTO: 92 FL (ref 80–99)
MONOCYTES # BLD: 0.2 K/UL (ref 0–1)
MONOCYTES NFR BLD: 6 % (ref 5–13)
NEUTS SEG # BLD: 2.9 K/UL (ref 1.8–8)
NEUTS SEG NFR BLD: 73 % (ref 32–75)
NRBC # BLD: 0 K/UL (ref 0–0.01)
NRBC BLD-RTO: 0 PER 100 WBC
PLATELET # BLD AUTO: 195 K/UL (ref 150–400)
PMV BLD AUTO: 10.4 FL (ref 8.9–12.9)
POTASSIUM SERPL-SCNC: 4.8 MMOL/L (ref 3.5–5.1)
PROT SERPL-MCNC: 7.2 G/DL (ref 6.4–8.2)
PROTHROMBIN TIME: 11.6 SEC (ref 9–11.1)
RBC # BLD AUTO: 3.73 M/UL (ref 4.1–5.7)
RBC MORPH BLD: ABNORMAL
SAMPLES BEING HELD,HOLD: NORMAL
SERVICE CMNT-IMP: ABNORMAL
SERVICE CMNT-IMP: NORMAL
SODIUM SERPL-SCNC: 131 MMOL/L (ref 136–145)
WBC # BLD AUTO: 4 K/UL (ref 4.1–11.1)

## 2021-08-14 PROCEDURE — 0042T CT CODE NEURO PERF W CBF: CPT

## 2021-08-14 PROCEDURE — 80053 COMPREHEN METABOLIC PANEL: CPT

## 2021-08-14 PROCEDURE — 74011250637 HC RX REV CODE- 250/637: Performed by: PSYCHIATRY & NEUROLOGY

## 2021-08-14 PROCEDURE — 74011000636 HC RX REV CODE- 636: Performed by: STUDENT IN AN ORGANIZED HEALTH CARE EDUCATION/TRAINING PROGRAM

## 2021-08-14 PROCEDURE — 70450 CT HEAD/BRAIN W/O DYE: CPT

## 2021-08-14 PROCEDURE — 65660000000 HC RM CCU STEPDOWN

## 2021-08-14 PROCEDURE — 74011250636 HC RX REV CODE- 250/636: Performed by: HOSPITALIST

## 2021-08-14 PROCEDURE — 70496 CT ANGIOGRAPHY HEAD: CPT

## 2021-08-14 PROCEDURE — 36415 COLL VENOUS BLD VENIPUNCTURE: CPT

## 2021-08-14 PROCEDURE — 99285 EMERGENCY DEPT VISIT HI MDM: CPT

## 2021-08-14 PROCEDURE — 85610 PROTHROMBIN TIME: CPT

## 2021-08-14 PROCEDURE — 85025 COMPLETE CBC W/AUTO DIFF WBC: CPT

## 2021-08-14 PROCEDURE — 99223 1ST HOSP IP/OBS HIGH 75: CPT | Performed by: PSYCHIATRY & NEUROLOGY

## 2021-08-14 PROCEDURE — 82962 GLUCOSE BLOOD TEST: CPT

## 2021-08-14 RX ORDER — CLOPIDOGREL 300 MG/1
300 TABLET, FILM COATED ORAL ONCE
Status: COMPLETED | OUTPATIENT
Start: 2021-08-14 | End: 2021-08-14

## 2021-08-14 RX ORDER — BUMETANIDE 2 MG/1
2 TABLET ORAL 2 TIMES DAILY
COMMUNITY

## 2021-08-14 RX ORDER — METFORMIN HYDROCHLORIDE 500 MG/1
500 TABLET ORAL 2 TIMES DAILY WITH MEALS
COMMUNITY

## 2021-08-14 RX ORDER — CLOPIDOGREL BISULFATE 75 MG/1
75 TABLET ORAL DAILY
Status: DISCONTINUED | OUTPATIENT
Start: 2021-08-15 | End: 2021-08-17 | Stop reason: HOSPADM

## 2021-08-14 RX ORDER — LANOLIN ALCOHOL/MO/W.PET/CERES
400 CREAM (GRAM) TOPICAL 2 TIMES DAILY
COMMUNITY

## 2021-08-14 RX ORDER — ASPIRIN 81 MG/1
TABLET ORAL DAILY
COMMUNITY
End: 2021-08-17

## 2021-08-14 RX ORDER — LISINOPRIL 5 MG/1
5 TABLET ORAL DAILY
COMMUNITY

## 2021-08-14 RX ORDER — SODIUM CHLORIDE 9 MG/ML
50 INJECTION, SOLUTION INTRAVENOUS CONTINUOUS
Status: DISPENSED | OUTPATIENT
Start: 2021-08-14 | End: 2021-08-15

## 2021-08-14 RX ADMIN — SODIUM CHLORIDE 50 ML/HR: 9 INJECTION, SOLUTION INTRAVENOUS at 16:40

## 2021-08-14 RX ADMIN — IOPAMIDOL 20 ML: 755 INJECTION, SOLUTION INTRAVENOUS at 10:00

## 2021-08-14 RX ADMIN — IOPAMIDOL 100 ML: 755 INJECTION, SOLUTION INTRAVENOUS at 10:00

## 2021-08-14 RX ADMIN — CLOPIDOGREL BISULFATE 300 MG: 300 TABLET, FILM COATED ORAL at 18:51

## 2021-08-14 NOTE — H&P
History & Physical    Primary Care Provider: Guera Reis MD  Source of Information: Patient     History of Presenting Illness:   Jillian Soto is a 77 y.o. male who presents with right facial droop, numbness    Patient is a 67-year-old male h/o DM, HTN, CHF who presents today right facial droop and weakness. RIGHT sided facial droop and numbness starting approx midnight. Pt c/o \"facial swelling\" that has gotten worse since midnight. Pt states also has numbness from hip down to feet. Patient stated he has chronic right legg weakness with numbness from previous procedure couple years back but now worse. pt said he is taking baby aspirin every day. Not on other  Anticoagulation. The patient denies any fever, chills, chest pain, cough, congestion, recent illness, palpitations, or dysuria. Review of Systems:  General: hpi, no changes of weight  HEENT: no headache, no vision changes, no nose discharge, no hearing changes   RES: no wheezing, no cough, no sob  CVS: no cp, no palpitation. Muscular: no joint swelling, right knee pain chronic,  no leg swelling  Skin: no rash, no itching   GI: no vomiting, no diarrhea  : no dysuria, no hematuria  Hemo: no gum bleeding, no petechial   Neuro: HPI   Endo: no polydipsia   Psych: denied depression     Past Medical History:   Diagnosis Date    Arthritis     CAD (coronary artery disease)     CABG    [facility] [address]     Heart failure ([facility] [address]     Hypertension     Other ill-defined conditions(799.89)     kidney stones 1990's      Past Surgical History:   Procedure Laterality Date    CARDIAC SURG PROCEDURE UNLIST      endarterectomy, CABG    HX CORONARY ARTERY BYPASS GRAFT      HX ORTHOPAEDIC      right knee surgery 2009     Prior to Admission medications    Medication Sig Start Date End Date Taking? Authorizing Provider   bumetanide (BUMEX) 2 mg tablet Take 2 mg by mouth two (2) times a day.    Yes Other, MD Osei lisinopriL (PRINIVIL, ZESTRIL) 5 mg tablet Take 5 mg by mouth daily. Yes Other, MD Osei   magnesium oxide (MAG-OX) 400 mg tablet Take 400 mg by mouth two (2) times a day. Yes Other, MD Osei   metFORMIN (GLUCOPHAGE) 500 mg tablet Take 500 mg by mouth two (2) times daily (with meals). Yes Other, MD Osei   allopurinoL (ZYLOPRIM) 100 mg tablet Take 100 mg by mouth daily. Yes Provider, Historical   potassium chloride SR (KLOR-CON 10) 10 mEq tablet Take 10 mEq by mouth. Yes Provider, Historical   pantoprazole (PROTONIX) 40 mg tablet Take 40 mg by mouth daily. Yes Provider, Historical   atorvastatin (LIPITOR) 40 mg tablet Take 80 mg by mouth daily. Yes Other, MD Osei   carvedilol (COREG) 6.25 mg tablet Take 1 Tab by mouth two (2) times daily (with meals). 4/6/12  Yes Delores Moscoso MD   insulin NPH (NovoLIN N NPH U-100 Insulin) 100 unit/mL injection 40 Units by SubCUTAneous route once. Traci, MD Osei   nitroglycerin (NITROSTAT) 0.4 mg SL tablet 1 Tab by SubLINGual route every five (5) minutes as needed for Chest Pain. 7/22/11   Babita Delarosa MD     No Known Allergies   Family History   Problem Relation Age of Onset    Diabetes Mother     Heart Disease Father     Cancer Sister     Kidney Disease Brother         SOCIAL HISTORY:  Patient resides:  Independently x   Assisted Living    SNF    With family care       Smoking history:   None    Former x   Chronic      Alcohol history:   None x   Social    Chronic      Ambulates:   Independently    w/cane    w/walker x   w/wc    CODE STATUS:  DNR    Full x   Other      Objective:     Physical Exam:     Visit Vitals  BP (!) 124/55   Pulse 60   Temp 98.1 °F (36.7 °C)   Resp 23   Wt 104.7 kg (230 lb 13.2 oz)   SpO2 98%   BMI 35.10 kg/m²      O2 Device: None (Room air)    General:  Alert, cooperative, no distress, appears stated age. Head:  Normocephalic, without obvious abnormality, atraumatic. Eyes:  Conjunctivae/corneas clear.  PERRL, EOMs intact. Nose: Nares normal. Septum midline. Mucosa normal. No drainage or sinus tenderness. Throat: Lips, mucosa, and tongue normal. Teeth and gums normal.   Neck: Supple, symmetrical, trachea midline, no adenopathy, thyroid: no enlargement/tenderness/nodules, no carotid bruit and no JVD. Back:   Symmetric, no curvature. ROM normal. No CVA tenderness. Lungs:   Clear to auscultation bilaterally. Chest wall:  No tenderness or deformity. Heart:  Regular rate and rhythm, S1, S2 normal, no murmur, click, rub or gallop. Abdomen:   Soft, non-tender. Bowel sounds normal. No masses,  No organomegaly. Extremities: Extremities normal, atraumatic, no cyanosis or edema. Pulses: 2+ and symmetric all extremities. Skin: Skin color, texture, turgor normal. No rashes or lesions   Neurologic: Right facial droop. Right leg 4+/5. Right leg and right face numbness. Data Review:     Recent Days:  Recent Labs     08/14/21  1418   WBC 4.0*   HGB 11.1*   HCT 34.3*        Recent Labs     08/14/21  1418   *   K 4.8      CO2 26   *   BUN 54*   CREA 2.80*   CA 8.8   ALB 3.6   ALT 14   INR 1.1     No results for input(s): PH, PCO2, PO2, HCO3, FIO2 in the last 72 hours.     24 Hour Results:  Recent Results (from the past 24 hour(s))   GLUCOSE, POC    Collection Time: 08/14/21  1:47 PM   Result Value Ref Range    Glucose (POC) 122 (H) 65 - 117 mg/dL    Performed by Janette Dovray  ED tech    METABOLIC PANEL, COMPREHENSIVE    Collection Time: 08/14/21  2:18 PM   Result Value Ref Range    Sodium 131 (L) 136 - 145 mmol/L    Potassium 4.8 3.5 - 5.1 mmol/L    Chloride 102 97 - 108 mmol/L    CO2 26 21 - 32 mmol/L    Anion gap 3 (L) 5 - 15 mmol/L    Glucose 111 (H) 65 - 100 mg/dL    BUN 54 (H) 6 - 20 MG/DL    Creatinine 2.80 (H) 0.70 - 1.30 MG/DL    BUN/Creatinine ratio 19 12 - 20      GFR est AA 27 (L) >60 ml/min/1.73m2    GFR est non-AA 22 (L) >60 ml/min/1.73m2    Calcium 8.8 8.5 - 10.1 MG/DL Bilirubin, total 0.9 0.2 - 1.0 MG/DL    ALT (SGPT) 14 12 - 78 U/L    AST (SGOT) 18 15 - 37 U/L    Alk. phosphatase 113 45 - 117 U/L    Protein, total 7.2 6.4 - 8.2 g/dL    Albumin 3.6 3.5 - 5.0 g/dL    Globulin 3.6 2.0 - 4.0 g/dL    A-G Ratio 1.0 (L) 1.1 - 2.2     CBC WITH AUTOMATED DIFF    Collection Time: 08/14/21  2:18 PM   Result Value Ref Range    WBC 4.0 (L) 4.1 - 11.1 K/uL    RBC 3.73 (L) 4.10 - 5.70 M/uL    HGB 11.1 (L) 12.1 - 17.0 g/dL    HCT 34.3 (L) 36.6 - 50.3 %    MCV 92.0 80.0 - 99.0 FL    MCH 29.8 26.0 - 34.0 PG    MCHC 32.4 30.0 - 36.5 g/dL    RDW 16.4 (H) 11.5 - 14.5 %    PLATELET 171 817 - 813 K/uL    MPV 10.4 8.9 - 12.9 FL    NRBC 0.0 0  WBC    ABSOLUTE NRBC 0.00 0.00 - 0.01 K/uL    NEUTROPHILS 73 32 - 75 %    LYMPHOCYTES 17 12 - 49 %    MONOCYTES 6 5 - 13 %    EOSINOPHILS 4 0 - 7 %    BASOPHILS 0 0 - 1 %    IMMATURE GRANULOCYTES 0 0.0 - 0.5 %    ABS. NEUTROPHILS 2.9 1.8 - 8.0 K/UL    ABS. LYMPHOCYTES 0.7 (L) 0.8 - 3.5 K/UL    ABS. MONOCYTES 0.2 0.0 - 1.0 K/UL    ABS. EOSINOPHILS 0.2 0.0 - 0.4 K/UL    ABS. BASOPHILS 0.0 0.0 - 0.1 K/UL    ABS. IMM. GRANS. 0.0 0.00 - 0.04 K/UL    DF SMEAR SCANNED      RBC COMMENTS ANISOCYTOSIS  1+       PROTHROMBIN TIME + INR    Collection Time: 08/14/21  2:18 PM   Result Value Ref Range    INR 1.1 0.9 - 1.1      Prothrombin time 11.6 (H) 9.0 - 11.1 sec   SAMPLES BEING HELD    Collection Time: 08/14/21  2:18 PM   Result Value Ref Range    SAMPLES BEING HELD 1red     COMMENT        Add-on orders for these samples will be processed based on acceptable specimen integrity and analyte stability, which may vary by analyte. GLUCOSE, POC    Collection Time: 08/14/21  2:26 PM   Result Value Ref Range    Glucose (POC) 100 65 - 117 mg/dL    Performed by Salma Greer          Imaging:   CT CODE NEURO HEAD WO CONTRAST    Result Date: 8/14/2021  1. No evidence of acute infarct or intracranial hemorrhage.  2. Mild periventricular white matter disease is likely secondary to chronic small vessel ischemic changes. The findings were called to Dr. Kenji Cohn. on 8/14/2021 at 2:08 PM by Dr. Cholo Booker. [address]  Negative CT perfusion study. Proximal right vertebral artery proximal occlusion with reconstitution of a  small irregular vessel in the mid cervical region which is continuously patent  distally, with areas of severe stenosis in the V3 and proximal V4 segments. Patent, diminutive left vertebral artery with multiple areas of severe stenosis  in the cervical region as well as short segment occlusion in the proximal V4  segment. Small irregular basilar artery which is patent. Bilaterally patent  posterior communicating arteries and posterior cerebral arteries. .     No extracranial stenosis in the anterior circulation. Previous right carotid  endarterectomy. Moderate left and moderate to severe right paraclinoid distal  internal carotid stenosis stenosis. Patent anterior and middle cerebral arteries  and major branches. Assessment:     Active Problems:    CVA (cerebral vascular accident) ([facility] [address] (8/14/2021)           Plan:     1. Cva: neuro tele monitor. Stroke work up including brain MRI, echo. Neuro consult. A1c, flp. PT/OT/SPL. Continue asa, statin now. 2. JULIO CESAR on CKD 3: cr worse compared to his baseline around 1.4. give ivf 50cc/hr x1 liter hold bumes, follow cr. Hold lisinopirl. 3. CHF: hold bumex and lisinopirl due to julio cesar. continue  coreg, follow volume status  4 DM: nph and SSI, will follow. Hold metformin  5.  Htn: permissive hypertension, SBP -140        Signed By: Joel Lomeli MD     August 14, 2021

## 2021-08-14 NOTE — PROGRESS NOTES
NIHSS Stroke Scale    Person Administering Scale: Casimiro Castanon NP    Time: 2:06 PM    LKW: Midnight    PMH: Hypertension, hyperlipidemia, CHF, and diabetes    SUBJECTIVE: Right facial droop associated with numbness and right leg numbness. The patient reported right facial droop and numbness associated with R leg numbness and weakness which started midnight. Patient stated he has chronic right legg weakness with numbness from previous procedure couple years back but now worse. No aspirin, Plavix, or any anticoagulation at home. 1a  Level of consciousness: 0=alert; keenly responsive   1b. LOC questions:  0=Answers both questions correctly   1c. LOC commands: 0=Performs both tasks correctly   2. Best Gaze: 0=normal   3. Visual: 0=No visual loss   4. Facial Palsy: 2=Partial paralysis (total or near total paralysis of the lower face)   5a. Motor left arm: 0=No drift, arm holds 90 (or 45) degrees for full 10 seconds   5b. Motor right arm: 0=No drift, arm holds 90 (or 45) degrees for full 10 seconds   6a. Motor left le=No drift; leg holds 30-degree position for full 5 seconds. 6b  Motor right le=Some effort against gravity; leg falls to bed by 5 seconds, but has some effort against gravity. 7. Limb Ataxia: 0=Absent   8. Sensory: 1=Mild to moderate sensory loss; patient feels pinprick is less sharp or is dull on the affected side; or there is a loss of superficial pain with pinprick but patient is aware of being touched. 9. Best Language:  0=No aphasia, normal   10. Dysarthria: 1=Mild to moderate, patient slurs at least some words and at worst, can be understood with some difficulty   11.  Extinction and Inattention: 0=No abnormality    Total:    6     TPA Candidate: NO    Mechanical Thrombectomy Candidate: NO    CT Results (most recent):  CT Results  (Last 48 hours)               21 1432  CTA CODE NEURO HEAD AND NECK W CONT Preliminary result    Narrative:  PRELIMINARY REPORT       Negative CT perfusion study. Proximal right vertebral artery proximal occlusion with reconstitution of a   small irregular vessel in the mid cervical region which is continuously patent   distally, with areas of severe stenosis in the V3 and proximal V4 segments. Patent, diminutive left vertebral artery with multiple areas of severe stenosis   in the cervical region as well as short segment occlusion in the proximal V4   segment. Small irregular basilar artery which is patent. Bilaterally patent   posterior communicating arteries and posterior cerebral arteries. .       No extracranial stenosis in the anterior circulation. Previous right carotid   endarterectomy. Moderate left and moderate to severe right paraclinoid distal   internal carotid stenosis stenosis. Patent anterior and middle cerebral arteries   and major branches. Preliminary report was provided by Dr. Lyly Santiago, the on-call radiologist, at   14:46 hours on 8/14/2021       Final report to follow. END PRELIMINARY REPORT                               08/14/21 1432  CT CODE NEURO PERF W CBF Preliminary result    Narrative:  PRELIMINARY REPORT       Negative CT perfusion study. Proximal right vertebral artery proximal occlusion with reconstitution of a   small irregular vessel in the mid cervical region which is continuously patent   distally, with areas of severe stenosis in the V3 and proximal V4 segments. Patent, diminutive left vertebral artery with multiple areas of severe stenosis   in the cervical region as well as short segment occlusion in the proximal V4   segment. Small irregular basilar artery which is patent. Bilaterally patent   posterior communicating arteries and posterior cerebral arteries. .       No extracranial stenosis in the anterior circulation. Previous right carotid   endarterectomy. Moderate left and moderate to severe right paraclinoid distal   internal carotid stenosis stenosis.  Patent anterior and middle cerebral arteries and major branches. Preliminary report was provided by Dr. Omar Dotson, the on-call radiologist, at   14:46 hours on 8/14/2021       Final report to follow. END PRELIMINARY REPORT                               08/14/21 8214  CT CODE NEURO HEAD WO CONTRAST Final result    Impression:  1. No evidence of acute infarct or intracranial hemorrhage. 2. Mild periventricular white matter disease is likely secondary to chronic   small vessel ischemic changes. The findings were called to Dr. Duyen Bunch on 8/14/2021 at 2:08 PM by Dr. Karl Waite9       Narrative: Indication:  stroke        Comparison: None       Findings: 5 mm axial images were obtained from the skull base through the   vertex. CT dose reduction was achieved through the use of a standardized protocol   tailored for this examination and automatic exposure control for dose   modulation. The ventricles and cortical sulci are prominent, compatible with age related   volume loss. There is no evidence of intracranial hemorrhage, mass, mass effect,   or acute infarct. There is periventricular white matter disease. No extra-axial   fluid collections are seen. The visualized paranasal sinuses and mastoid air   cells are clear. The orbital structures are unremarkable. No osseous   abnormalities are seen. Discussed with: ED physician Dr. Liliana Robin, and ED nurses    Time spent: 35 minutes.      Karissa Masters NP  Neurocritical Care Nurse Practitioner  [phone]

## 2021-08-14 NOTE — CONSULTS
Consult dictated. 78-year-old with hypertension, diabetes, intracranial atherosclerotic vascular disease who woke up with right-sided numbness and weakness. Clinically it appears to be a central type of weakness and suspect small vessel lacunar infarct. Continue aspirin and statin. Complete stroke work-up including MRI, echo and lipid panel. Add Plavix.    Becky Rodas MD

## 2021-08-14 NOTE — ED PROVIDER NOTES
HPI     Patient is a 67-year-old male who presents to the ED with right-sided facial droop and numbness. .  Patient said that he noticed at midnight last night, he started to have \"facial swelling, with numbness on the right side. This morning, family noticed that his right face had an abnormal smile compared to his left. He also says that he has been having weakness and numbness in his right leg, although this is been going on for several weeks. He was brought to the ED for further evaluation.     Past Medical History:   Diagnosis Date    Arthritis     CAD (coronary artery disease)     CABG    Diabetes (HCC)     Heart failure (HCC)     Hypertension     Other ill-defined conditions(799.89)     kidney stones        Past Surgical History:   Procedure Laterality Date    CARDIAC SURG PROCEDURE UNLIST      endarterectomy, CABG    HX CORONARY ARTERY BYPASS GRAFT      HX ORTHOPAEDIC      right knee surgery          Family History:   Problem Relation Age of Onset    Diabetes Mother     Heart Disease Father     Cancer Sister     Kidney Disease Brother        Social History     Socioeconomic History    Marital status:      Spouse name: Not on file    Number of children: Not on file    Years of education: Not on file    Highest education level: Not on file   Occupational History    Not on file   Tobacco Use    Smoking status: Former Smoker     Quit date: 1986     Years since quittin.6    Smokeless tobacco: Former User     Quit date: 1989   Substance and Sexual Activity    Alcohol use: No     Alcohol/week: 0.0 standard drinks    Drug use: No    Sexual activity: Yes     Partners: Female   Other Topics Concern    Not on file   Social History Narrative    Not on file     Social Determinants of Health     Financial Resource Strain:     Difficulty of Paying Living Expenses:    Food Insecurity:     Worried About Running Out of Food in the Last Year:     [address] in the Last Year:    Transportation Needs:     Lack of Transportation (Medical):  Lack of Transportation (Non-Medical):    Physical Activity:     Days of Exercise per Week:     Minutes of Exercise per Session:    Stress:     Feeling of Stress :    Social Connections:     Frequency of Communication with Friends and Family:     Frequency of Social Gatherings with Friends and Family:     Attends Mu-ism Services:     Active Member of Clubs or Organizations:     Attends Club or Organization Meetings:     Marital Status:    Intimate Partner Violence:     Fear of Current or Ex-Partner:     Emotionally Abused:     Physically Abused:     Sexually Abused: ALLERGIES: Patient has no known allergies. Review of Systems   Constitutional: Negative for appetite change and fever. HENT: Negative for congestion and rhinorrhea. Eyes: Negative for discharge and redness. Respiratory: Negative for cough and shortness of breath. Cardiovascular: Negative for chest pain. Gastrointestinal: Negative for abdominal pain, diarrhea, nausea and vomiting. Genitourinary: Negative for decreased urine volume and dysuria. Musculoskeletal: Negative for back pain. Skin: Negative for rash and wound. Neurological: Positive for facial asymmetry, weakness and numbness. Negative for seizures and headaches. Hematological: Does not bruise/bleed easily. Psychiatric/Behavioral: Negative for agitation. All other systems reviewed and are negative. Vitals:    08/14/21 1600 08/14/21 1630 08/14/21 1700 08/14/21 1730   BP: 134/68 137/67 (!) 146/63 (!) 146/62   Pulse: 60 60 (!) 56 (!) 57   Resp: 11 20 23 17   Temp:       SpO2: 99% 99% 99% 100%   Weight:                Physical Exam  Vitals and nursing note reviewed. Constitutional:       General: He is not in acute distress. Appearance: He is well-developed. He is not diaphoretic. HENT:      Head: Normocephalic and atraumatic.       Right Ear: External ear normal.      Left Ear: External ear normal.      Nose: Nose normal.   Eyes:      General:         Right eye: No discharge. Left eye: No discharge. Extraocular Movements: Extraocular movements intact. Conjunctiva/sclera: Conjunctivae normal.      Pupils: Pupils are equal, round, and reactive to light. Cardiovascular:      Rate and Rhythm: Normal rate and regular rhythm. Pulses: Normal pulses. Heart sounds: Normal heart sounds. No murmur heard. No friction rub. No gallop. Pulmonary:      Effort: Pulmonary effort is normal. No respiratory distress. Breath sounds: Normal breath sounds. No stridor. No wheezing or rales. Chest:      Chest wall: No tenderness. Abdominal:      General: Bowel sounds are normal. There is no distension. Palpations: Abdomen is soft. There is no mass. Tenderness: There is no abdominal tenderness. There is no guarding or rebound. Musculoskeletal:         General: No deformity. Normal range of motion. Cervical back: Normal range of motion and neck supple. Skin:     General: Skin is warm and dry. Capillary Refill: Capillary refill takes less than 2 seconds. Findings: No rash. Neurological:      Mental Status: He is alert and oriented to person, place, and time. Sensory: Sensory deficit present. Motor: Weakness present. Coordination: Coordination normal.      Comments: Alert. Right-sided facial droop. Right facial numbness. MSK 2 out of 5 to the right lower extremity. Numbness and tingling to the right lower extremity. Psychiatric:         Behavior: Behavior normal.          [facility]        MEDICAL DECISION MAKIN y.o. male presents with Facial Droop and Numbness    Differential diagnosis includes but not limited to: Acute stroke versus Bell's palsy versus electrode abnormality versus migraine    Patient is a 76-year-old male who presents today as a level 2 stroke alert.   Patient said around midnight last night, he noticed that his right face felt numb. This morning, he was found to have a right facial droop. He also says that he feels numbness and weakness in his right leg, although this has been going on for several months.    1:56 PM  Code Stroke Level 2 was activated by the ER physician as the patient presented with symptoms consistent with stroke. The last time known well was approximately 14 hours ago. The patient is not in the window for tPA administration. He will be evaluated by the on-call acute care tele-neurologist.     CT head negative. I spoke with telemetry neurology, with plan to admit the patient for stroke work-up. I reviewed the CTA head and neck. I spoke with Dr. Stephanie Blair, neuro interventional radiologist.  No acute surgical intervention at this time. Perfect Serve Consult for Admission  3:10 PM    ED Room Number: ER10/10  Patient Name and age:  Myrna Willis 77 y.o.  male  Working Diagnosis:     IMPRESSION:  1. Right facial numbness    2. Facial droop    3. Right leg weakness    4. Hyponatremia    5. Acute kidney injury ([address]      COVID-19 Suspicion:  no  Sepsis present:  no  Reassessment needed: no  Code Status:  Full Code  Readmission: no  Isolation Requirements:  no  Recommended Level of Care:  step down  Department:[facility] Adult ED - 21   Other: Patient is a 28-year-old male who presents today as a level 2 stroke alert. Last night around midnight, patient noticed that his right face felt numb. He woke up and was found to have an asymmetric smile. He also admits to right leg numbness and weakness that has been going on for several weeks. CT head negative for acute stroke. CT perfusion reviewed. I spoke with Dr. Stephanie Blair, neuro interventionalist, who reviewed imaging. No surgical ntervention at this time.     LABORATORY TESTS:  Labs Reviewed   METABOLIC PANEL, COMPREHENSIVE - Abnormal; Notable for the following components:       Result Value    Sodium 131 (*)     Anion gap 3 (*)     Glucose 111 (*)     BUN 54 (*)     Creatinine 2.80 (*)     GFR est AA 27 (*)     GFR est non-AA 22 (*)     A-G Ratio 1.0 (*)     All other components within normal limits   CBC WITH AUTOMATED DIFF - Abnormal; Notable for the following components:    WBC 4.0 (*)     RBC 3.73 (*)     HGB 11.1 (*)     HCT 34.3 (*)     RDW 16.4 (*)     ABS. LYMPHOCYTES 0.7 (*)     All other components within normal limits   PROTHROMBIN TIME + INR - Abnormal; Notable for the following components:    Prothrombin time 11.6 (*)     All other components within normal limits   GLUCOSE, POC - Abnormal; Notable for the following components:    Glucose (POC) 122 (*)     All other components within normal limits   SAMPLES BEING HELD   GLUCOSE, POC       IMAGING RESULTS:  CTA CODE NEURO HEAD AND NECK W CONT         CT CODE NEURO PERF W CBF         CT CODE NEURO HEAD WO CONTRAST   Final Result   1. No evidence of acute infarct or intracranial hemorrhage. 2. Mild periventricular white matter disease is likely secondary to chronic   small vessel ischemic changes. The findings were called to Dr. America Briones. on 8/14/2021 at 2:08 PM by Dr. Garret Webb. [address]:  Medications   iopamidoL (ISOVUE-370) 76 % injection 50 mL (has no administration in time range)   iopamidoL (ISOVUE-370) 76 % injection 100 mL (has no administration in time range)   0.9% sodium chloride infusion (50 mL/hr IntraVENous New Bag 8/14/21 1640)          CONSULTS:  Neurology  Neurointerventionalist    IMPRESSION:  1. Right facial numbness    2. Facial droop    3. Right leg weakness    4. Hyponatremia    5.  Acute kidney injury ([facility] [address]        PLAN:  - Admit to hospitalist    Total critical care time spent exclusive of procedures:  60 minutes    Ihsan Fitzgerald MD            Procedures

## 2021-08-14 NOTE — ED TRIAGE NOTES
Pt with RIGHT sided facial droop and numbness starting approx midnight. Pt c/o \"facial swelling\" that has gotten worse since midnight. Pt states also has numbness from hip down to feet. Pt is unsure how long numbness and weakness in right leg has been going on. Pt hx falls and right leg pain.

## 2021-08-15 ENCOUNTER — APPOINTMENT (OUTPATIENT)
Dept: NON INVASIVE DIAGNOSTICS | Age: 76
DRG: 069 | End: 2021-08-15
Attending: PSYCHIATRY & NEUROLOGY
Payer: MEDICARE

## 2021-08-15 PROBLEM — R29.810 WEAKNESS ON RIGHT SIDE OF FACE: Status: ACTIVE | Noted: 2021-08-15

## 2021-08-15 LAB
ALBUMIN SERPL-MCNC: 3 G/DL (ref 3.5–5)
ALBUMIN/GLOB SERPL: 1 {RATIO} (ref 1.1–2.2)
ALP SERPL-CCNC: 92 U/L (ref 45–117)
ALT SERPL-CCNC: 11 U/L (ref 12–78)
ANION GAP SERPL CALC-SCNC: 3 MMOL/L (ref 5–15)
ASPIRIN TEST, ASPIRN: 637 ARU
AST SERPL-CCNC: 17 U/L (ref 15–37)
BASOPHILS # BLD: 0 K/UL (ref 0–0.1)
BASOPHILS NFR BLD: 0 % (ref 0–1)
BILIRUB SERPL-MCNC: 0.7 MG/DL (ref 0.2–1)
BUN SERPL-MCNC: 45 MG/DL (ref 6–20)
BUN/CREAT SERPL: 23 (ref 12–20)
CALCIUM SERPL-MCNC: 8.4 MG/DL (ref 8.5–10.1)
CHLORIDE SERPL-SCNC: 106 MMOL/L (ref 97–108)
CHOLEST SERPL-MCNC: 119 MG/DL
CO2 SERPL-SCNC: 28 MMOL/L (ref 21–32)
CREAT SERPL-MCNC: 2 MG/DL (ref 0.7–1.3)
DIFFERENTIAL METHOD BLD: ABNORMAL
ECHO AO ROOT DIAM: 3.93 CM
ECHO AV AREA PEAK VELOCITY: 1.89 CM2
ECHO AV AREA/BSA PEAK VELOCITY: 0.9 CM2/M2
ECHO AV PEAK GRADIENT: 8.2 MMHG
ECHO AV PEAK VELOCITY: 143.19 CM/S
ECHO LA MAJOR AXIS: 4.88 CM
ECHO LA MINOR AXIS: 2.25 CM
ECHO LV E' LATERAL VELOCITY: 3.18 CM/S
ECHO LV E' SEPTAL VELOCITY: 3.66 CM/S
ECHO LV INTERNAL DIMENSION DIASTOLIC: 4.58 CM (ref 4.2–5.9)
ECHO LV INTERNAL DIMENSION SYSTOLIC: 3.24 CM
ECHO LV IVSD: 1.04 CM (ref 0.6–1)
ECHO LV MASS 2D: 185.8 G (ref 88–224)
ECHO LV MASS INDEX 2D: 85.6 G/M2 (ref 49–115)
ECHO LV POSTERIOR WALL DIASTOLIC: 1.21 CM (ref 0.6–1)
ECHO LVOT DIAM: 2.05 CM
ECHO LVOT PEAK GRADIENT: 2.66 MMHG
ECHO LVOT PEAK VELOCITY: 81.51 CM/S
ECHO MV A VELOCITY: 124.24 CM/S
ECHO MV AREA PHT: 3.18 CM2
ECHO MV E DECELERATION TIME (DT): 238.93 MS
ECHO MV E VELOCITY: 101.71 CM/S
ECHO MV E/A RATIO: 0.82
ECHO MV E/E' LATERAL: 31.98
ECHO MV E/E' RATIO (AVERAGED): 29.89
ECHO MV E/E' SEPTAL: 27.79
ECHO MV PRESSURE HALF TIME (PHT): 69.29 MS
ECHO PV MAX VELOCITY: 83.56 CM/S
ECHO PV PEAK INSTANTANEOUS GRADIENT SYSTOLIC: 2.79 MMHG
ECHO RV TAPSE: 0.83 CM (ref 1.5–2)
EOSINOPHIL # BLD: 0.1 K/UL (ref 0–0.4)
EOSINOPHIL NFR BLD: 4 % (ref 0–7)
ERYTHROCYTE [DISTWIDTH] IN BLOOD BY AUTOMATED COUNT: 16 % (ref 11.5–14.5)
EST. AVERAGE GLUCOSE BLD GHB EST-MCNC: 146 MG/DL
GLOBULIN SER CALC-MCNC: 3.1 G/DL (ref 2–4)
GLUCOSE BLD STRIP.AUTO-MCNC: 128 MG/DL (ref 65–117)
GLUCOSE BLD STRIP.AUTO-MCNC: 89 MG/DL (ref 65–117)
GLUCOSE BLD STRIP.AUTO-MCNC: 89 MG/DL (ref 65–117)
GLUCOSE BLD STRIP.AUTO-MCNC: 98 MG/DL (ref 65–117)
GLUCOSE SERPL-MCNC: 92 MG/DL (ref 65–100)
HBA1C MFR BLD: 6.7 % (ref 4–5.6)
HCT VFR BLD AUTO: 33.5 % (ref 36.6–50.3)
HDLC SERPL-MCNC: 26 MG/DL
HDLC SERPL: 4.6 {RATIO} (ref 0–5)
HGB BLD-MCNC: 10.7 G/DL (ref 12.1–17)
IMM GRANULOCYTES # BLD AUTO: 0 K/UL (ref 0–0.04)
IMM GRANULOCYTES NFR BLD AUTO: 1 % (ref 0–0.5)
LDLC SERPL CALC-MCNC: 65.4 MG/DL (ref 0–100)
LYMPHOCYTES # BLD: 0.6 K/UL (ref 0.8–3.5)
LYMPHOCYTES NFR BLD: 18 % (ref 12–49)
MAGNESIUM SERPL-MCNC: 1.5 MG/DL (ref 1.6–2.4)
MCH RBC QN AUTO: 29.8 PG (ref 26–34)
MCHC RBC AUTO-ENTMCNC: 31.9 G/DL (ref 30–36.5)
MCV RBC AUTO: 93.3 FL (ref 80–99)
MONOCYTES # BLD: 0.2 K/UL (ref 0–1)
MONOCYTES NFR BLD: 6 % (ref 5–13)
NEUTS SEG # BLD: 2.5 K/UL (ref 1.8–8)
NEUTS SEG NFR BLD: 71 % (ref 32–75)
NRBC # BLD: 0 K/UL (ref 0–0.01)
NRBC BLD-RTO: 0 PER 100 WBC
P2Y12 PLT RESPONSE,PPPR: 329 PRU (ref 194–418)
PHOSPHATE SERPL-MCNC: 2.9 MG/DL (ref 2.6–4.7)
PLATELET # BLD AUTO: 160 K/UL (ref 150–400)
PMV BLD AUTO: 9.7 FL (ref 8.9–12.9)
POTASSIUM SERPL-SCNC: 4.2 MMOL/L (ref 3.5–5.1)
PROT SERPL-MCNC: 6.1 G/DL (ref 6.4–8.2)
RBC # BLD AUTO: 3.59 M/UL (ref 4.1–5.7)
RBC MORPH BLD: ABNORMAL
RBC MORPH BLD: ABNORMAL
SERVICE CMNT-IMP: ABNORMAL
SERVICE CMNT-IMP: NORMAL
SODIUM SERPL-SCNC: 137 MMOL/L (ref 136–145)
TRIGL SERPL-MCNC: 138 MG/DL (ref ?–150)
VLDLC SERPL CALC-MCNC: 27.6 MG/DL
WBC # BLD AUTO: 3.4 K/UL (ref 4.1–11.1)

## 2021-08-15 PROCEDURE — 85576 BLOOD PLATELET AGGREGATION: CPT

## 2021-08-15 PROCEDURE — 85025 COMPLETE CBC W/AUTO DIFF WBC: CPT

## 2021-08-15 PROCEDURE — 80061 LIPID PANEL: CPT

## 2021-08-15 PROCEDURE — C8929 TTE W OR WO FOL WCON,DOPPLER: HCPCS

## 2021-08-15 PROCEDURE — 97535 SELF CARE MNGMENT TRAINING: CPT

## 2021-08-15 PROCEDURE — 97161 PT EVAL LOW COMPLEX 20 MIN: CPT | Performed by: PHYSICAL THERAPIST

## 2021-08-15 PROCEDURE — 4A03X5D MEASUREMENT OF ARTERIAL FLOW, INTRACRANIAL, EXTERNAL APPROACH: ICD-10-PCS | Performed by: STUDENT IN AN ORGANIZED HEALTH CARE EDUCATION/TRAINING PROGRAM

## 2021-08-15 PROCEDURE — 97116 GAIT TRAINING THERAPY: CPT | Performed by: PHYSICAL THERAPIST

## 2021-08-15 PROCEDURE — 97530 THERAPEUTIC ACTIVITIES: CPT | Performed by: PHYSICAL THERAPIST

## 2021-08-15 PROCEDURE — 74011000250 HC RX REV CODE- 250: Performed by: INTERNAL MEDICINE

## 2021-08-15 PROCEDURE — 97165 OT EVAL LOW COMPLEX 30 MIN: CPT

## 2021-08-15 PROCEDURE — 82962 GLUCOSE BLOOD TEST: CPT

## 2021-08-15 PROCEDURE — 74011250636 HC RX REV CODE- 250/636: Performed by: HOSPITALIST

## 2021-08-15 PROCEDURE — 36415 COLL VENOUS BLD VENIPUNCTURE: CPT

## 2021-08-15 PROCEDURE — 74011250636 HC RX REV CODE- 250/636: Performed by: INTERNAL MEDICINE

## 2021-08-15 PROCEDURE — 74011250637 HC RX REV CODE- 250/637: Performed by: PSYCHIATRY & NEUROLOGY

## 2021-08-15 PROCEDURE — 74011636637 HC RX REV CODE- 636/637: Performed by: HOSPITALIST

## 2021-08-15 PROCEDURE — 80053 COMPREHEN METABOLIC PANEL: CPT

## 2021-08-15 PROCEDURE — 74011250637 HC RX REV CODE- 250/637: Performed by: HOSPITALIST

## 2021-08-15 PROCEDURE — 99232 SBSQ HOSP IP/OBS MODERATE 35: CPT | Performed by: PSYCHIATRY & NEUROLOGY

## 2021-08-15 PROCEDURE — 83036 HEMOGLOBIN GLYCOSYLATED A1C: CPT

## 2021-08-15 PROCEDURE — 65660000000 HC RM CCU STEPDOWN

## 2021-08-15 PROCEDURE — 83735 ASSAY OF MAGNESIUM: CPT

## 2021-08-15 PROCEDURE — 84100 ASSAY OF PHOSPHORUS: CPT

## 2021-08-15 RX ORDER — LISINOPRIL 5 MG/1
5 TABLET ORAL DAILY
Status: DISCONTINUED | OUTPATIENT
Start: 2021-08-15 | End: 2021-08-17 | Stop reason: HOSPADM

## 2021-08-15 RX ORDER — ATORVASTATIN CALCIUM 40 MG/1
80 TABLET, FILM COATED ORAL DAILY
Status: DISCONTINUED | OUTPATIENT
Start: 2021-08-15 | End: 2021-08-17 | Stop reason: HOSPADM

## 2021-08-15 RX ORDER — ENOXAPARIN SODIUM 100 MG/ML
40 INJECTION SUBCUTANEOUS EVERY 24 HOURS
Status: DISCONTINUED | OUTPATIENT
Start: 2021-08-15 | End: 2021-08-17 | Stop reason: HOSPADM

## 2021-08-15 RX ORDER — DEXTROSE 50 % IN WATER (D50W) INTRAVENOUS SYRINGE
25-50 AS NEEDED
Status: DISCONTINUED | OUTPATIENT
Start: 2021-08-15 | End: 2021-08-17 | Stop reason: HOSPADM

## 2021-08-15 RX ORDER — ASPIRIN 325 MG
325 TABLET ORAL DAILY
Status: DISCONTINUED | OUTPATIENT
Start: 2021-08-15 | End: 2021-08-17 | Stop reason: HOSPADM

## 2021-08-15 RX ORDER — ASPIRIN 325 MG
325 TABLET ORAL ONCE
Status: COMPLETED | OUTPATIENT
Start: 2021-08-15 | End: 2021-08-15

## 2021-08-15 RX ORDER — ACETAMINOPHEN 650 MG/1
650 SUPPOSITORY RECTAL
Status: DISCONTINUED | OUTPATIENT
Start: 2021-08-15 | End: 2021-08-17 | Stop reason: HOSPADM

## 2021-08-15 RX ORDER — PANTOPRAZOLE SODIUM 40 MG/1
40 TABLET, DELAYED RELEASE ORAL DAILY
Status: DISCONTINUED | OUTPATIENT
Start: 2021-08-15 | End: 2021-08-17 | Stop reason: HOSPADM

## 2021-08-15 RX ORDER — LABETALOL HYDROCHLORIDE 5 MG/ML
5 INJECTION, SOLUTION INTRAVENOUS
Status: DISCONTINUED | OUTPATIENT
Start: 2021-08-15 | End: 2021-08-17 | Stop reason: HOSPADM

## 2021-08-15 RX ORDER — INSULIN LISPRO 100 [IU]/ML
INJECTION, SOLUTION INTRAVENOUS; SUBCUTANEOUS
Status: DISCONTINUED | OUTPATIENT
Start: 2021-08-15 | End: 2021-08-17 | Stop reason: HOSPADM

## 2021-08-15 RX ORDER — ACETAMINOPHEN 325 MG/1
650 TABLET ORAL
Status: DISCONTINUED | OUTPATIENT
Start: 2021-08-15 | End: 2021-08-17 | Stop reason: HOSPADM

## 2021-08-15 RX ORDER — BUMETANIDE 1 MG/1
2 TABLET ORAL 2 TIMES DAILY
Status: DISCONTINUED | OUTPATIENT
Start: 2021-08-15 | End: 2021-08-17 | Stop reason: HOSPADM

## 2021-08-15 RX ORDER — ALLOPURINOL 100 MG/1
100 TABLET ORAL DAILY
Status: DISCONTINUED | OUTPATIENT
Start: 2021-08-15 | End: 2021-08-17 | Stop reason: HOSPADM

## 2021-08-15 RX ORDER — MAGNESIUM SULFATE 100 %
4 CRYSTALS MISCELLANEOUS AS NEEDED
Status: DISCONTINUED | OUTPATIENT
Start: 2021-08-15 | End: 2021-08-17 | Stop reason: HOSPADM

## 2021-08-15 RX ORDER — CARVEDILOL 6.25 MG/1
6.25 TABLET ORAL 2 TIMES DAILY WITH MEALS
Status: DISCONTINUED | OUTPATIENT
Start: 2021-08-15 | End: 2021-08-17 | Stop reason: HOSPADM

## 2021-08-15 RX ADMIN — ASPIRIN 325 MG ORAL TABLET 325 MG: 325 PILL ORAL at 08:40

## 2021-08-15 RX ADMIN — HUMAN INSULIN 20 UNITS: 100 INJECTION, SUSPENSION SUBCUTANEOUS at 08:39

## 2021-08-15 RX ADMIN — PERFLUTREN 1.5 ML: 6.52 INJECTION, SUSPENSION INTRAVENOUS at 18:00

## 2021-08-15 RX ADMIN — ALLOPURINOL 100 MG: 100 TABLET ORAL at 08:40

## 2021-08-15 RX ADMIN — CLOPIDOGREL BISULFATE 75 MG: 75 TABLET ORAL at 08:40

## 2021-08-15 RX ADMIN — ASPIRIN 325 MG ORAL TABLET 325 MG: 325 PILL ORAL at 11:57

## 2021-08-15 RX ADMIN — CARVEDILOL 6.25 MG: 6.25 TABLET, FILM COATED ORAL at 17:14

## 2021-08-15 RX ADMIN — PANTOPRAZOLE SODIUM 40 MG: 40 TABLET, DELAYED RELEASE ORAL at 08:40

## 2021-08-15 RX ADMIN — HUMAN INSULIN 20 UNITS: 100 INJECTION, SUSPENSION SUBCUTANEOUS at 14:30

## 2021-08-15 RX ADMIN — CARVEDILOL 6.25 MG: 6.25 TABLET, FILM COATED ORAL at 08:40

## 2021-08-15 RX ADMIN — ATORVASTATIN CALCIUM 80 MG: 40 TABLET, FILM COATED ORAL at 08:40

## 2021-08-15 RX ADMIN — ENOXAPARIN SODIUM 40 MG: 40 INJECTION SUBCUTANEOUS at 08:41

## 2021-08-15 NOTE — PROGRESS NOTES
Problem: Mobility Impaired (Adult and Pediatric)  Goal: *Acute Goals and Plan of Care (Insert Text)  Description:   FUNCTIONAL STATUS PRIOR TO ADMISSION: Patient was modified independent using a rolling walker for functional mobility. HOME SUPPORT PRIOR TO ADMISSION: The patient lived with wife and daughter. Physical Therapy Goals  Initiated 8/15/2021  1. Patient will move from supine to sit and sit to supine  in bed with modified independence within 7 day(s). 2.  Patient will transfer from bed to chair and chair to bed with modified independence using the least restrictive device within 7 day(s). 3.  Patient will perform sit to stand with modified independence within 7 day(s). 4.  Patient will ambulate with supervision/set-up for 250 feet with the least restrictive device within 7 day(s). 5.  Patient will ascend/descend 4 stairs with 2 handrail(s) with supervision/set-up within 7 day(s). 6.  Patient will improve Verduzco Balance score by 7 points (40/56) within 7 days. Outcome: Progressing Towards Goal   PHYSICAL THERAPY EVALUATION- NEURO POPULATION  Patient: Emerson Hwang (44 y.o. male)  Date: 8/15/2021  Primary Diagnosis: CVA (cerebral vascular accident) [facility]) [I63.9]        Precautions: Fall, right sided weakness, drop foot. ASSESSMENT  Based on the objective data described below, the patient presents with right sided weakness affecting functional mobility and gait. He required stand by assistance with transfers and contact guard assistance with gait. He has right hip weakness and has a mild Trendelenburg pattern on the right as well as drop foot on the right, which is new to this admission. He compensates well for this weakness, but this puts him at risk for falling. The plan is to improve his functional independence, strength and dynamic balance, as well as his gait, and assess stairs as appropriate.     Current Level of Function Impacting Discharge (mobility/balance): stand by to contact guard assist.    Functional Outcome Measure: The patient scored Total: 33/56 on the Verduzco Balance Assessment which is indicative of moderate fall risk. Other factors to consider for discharge: Family support at home, son lives close to help. Patient will benefit from skilled therapy intervention to address the above noted impairments. PLAN :  Recommendations and Planned Interventions: bed mobility training, transfer training, gait training, therapeutic exercises, neuromuscular re-education, patient and family training/education, and therapeutic activities      Frequency/Duration: Patient will be followed by physical therapy:  5 times a week to address goals. Recommendation for discharge: (in order for the patient to meet his/her long term goals)  Physical therapy at least 2 days/week in the home AND ensure assist and/or supervision for safety with family members. This discharge recommendation:  Has not yet been discussed the attending provider and/or case management    IF patient discharges home will need the following DME: none         SUBJECTIVE:   Patient stated The numbness in my leg is getting better.   States right foot weakness (foot drop) is new to this admission.     OBJECTIVE DATA SUMMARY:   HISTORY:    Past Medical History:   Diagnosis Date    Arthritis     CAD (coronary artery disease)     CABG    Diabetes ([address]     Heart failure ([facility] [address]     Hypertension     Other ill-defined conditions(799.89)     kidney stones 1990's     Past Surgical History:   Procedure Laterality Date    CARDIAC SURG PROCEDURE UNLIST      endarterectomy, CABG    HX CORONARY ARTERY BYPASS GRAFT      HX ORTHOPAEDIC      right knee surgery 2009       Personal factors and/or comorbidities impacting plan of care: History of right LE weakness    Home Situation  Home Environment: Trailer/mobile home  # Steps to Enter: 3  Rails to Enter: Yes  Hand Rails : Bilateral  One/Two Story Residence: One story  Living Alone: No  Support Systems: Spouse/Significant Other/Partner, Family member(s)  Current DME Used/Available at Home: Walker, rolling, Crutches, Cane, straight  Tub or Shower Type: Shower    EXAMINATION/PRESENTATION/DECISION MAKING:   Critical Behavior:  Neurologic State: Alert  Orientation Level: Oriented X4  Cognition: Appropriate decision making  Safety/Judgement: Awareness of environment    Edema: B feet and ankles    Range Of Motion:  AROM: Generally decreased, functional (Decreased right hip flexion, minimal ankle DF)     Strength:    Strength: Generally decreased, functional (Right drop foot, right hip>knee weakness)     Tone & Sensation:   Tone: Normal     Sensation: Intact     Coordination:  Coordination: Within functional limits    Vision:   Tracking: Able to track stimulus in all quadrants w/o difficulty  Corrective Lenses: Glasses  Functional Mobility:  Bed Mobility:     Transfers:  Sit to Stand: Stand-by assistance  Stand to Sit: Stand-by assistance        Bed to Chair: Stand-by assistance        Balance:   Sitting: Intact  Standing: Intact; With support  Standing - Static: Good;Constant support  Standing - Dynamic : Constant support;Good (Fair without UE support)    Ambulation/Gait Training:  Distance (ft): 170 Feet (ft)  Assistive Device: Gait belt;Walker, rolling  Ambulation - Level of Assistance: Contact guard assistance        Gait Abnormalities: Trendelenburg; Foot drop (Compensates with increased hip flexoin for foot drop)     Speed/Kamille: Slow     Swing Pattern: Right asymmetrical        Stairs:  Not performed at this time.        Functional Measure  Verduzco Balance Test:    Sitting to Standing: 3  Standing Unsupported: 3  Sitting with Back Unsupported: 4  Standing to Sitting: 3  Transfers: 3  Standing Unsupported with Eyes Closed: 2  Standing Unsupported with Feet Together: 2  Reach Forward with Outstretched Arm: 3   Object: 2  Turn to Look Over Shoulders: 3  Turn 360 Degrees: 1  Alternate Foot on Step/Stool: 1  Standing Unsupported One Foot in Front: 2  Stand on One Le  Total: 33/56         56=Maximum possible score;   0-20=High fall risk  21-40=Moderate fall risk   41-56=Low fall risk        Physical Therapy Evaluation Charge Determination   History Examination Presentation Decision-Making   MEDIUM  Complexity : 1-2 comorbidities / personal factors will impact the outcome/ POC  LOW Complexity : 1-2 Standardized tests and measures addressing body structure, function, activity limitation and / or participation in recreation  LOW Complexity : Stable, uncomplicated  Other outcome measures Verduzco  MEDIUM      Based on the above components, the patient evaluation is determined to be of the following complexity level: LOW       Activity Tolerance:   Good      After treatment patient left in no apparent distress:   Sitting in chair and Call bell within reach    COMMUNICATION/EDUCATION:   The patients plan of care was discussed with: Occupational therapist.     Patient was educated regarding his deficit(s) of weakness, gait deviations as this relates to his diagnosis of CVA. He demonstrated Good understanding as evidenced by verbalizing and demonstration. Patient and/or family was verbally educated on the BE FAST acronym for signs/symptoms of CVA and TIA. BE FAST was written on patient's communication board  for visual education and reinforcement. All questions answered with patient indicating good. understanding. Fall prevention education was provided and the patient/caregiver indicated understanding.     Thank you for this referral.  Olga Chun, PT   Time Calculation: 27 mins

## 2021-08-15 NOTE — PROGRESS NOTES
TRANSITION OF CARE  RUR--18%  Disposition--Home with home health. Resume care order versus provider choice & referral.   PT evaluation 8/15/21 recommended home health. OT evalaution of 8/15/21: no needs. SLP evaluation pending. Transport--Family    Patient's primary family contact: wife Luisa Alvarado. CM gave patient first Medicare IM Letter which informed patient of the right to appeal the discharge. Patient signed the original which was given to the patient and a copy was placed on the chart. Care Management Interventions  PCP Verified by CM: Yes  Transition of Care Consult (CM Consult): Home Health  Physical Therapy Consult: Yes  Occupational Therapy Consult: Yes  Speech Therapy Consult: Yes  Current Support Network: Lives with Spouse  Confirm Follow Up Transport: Family  The Plan for Transition of Care is Related to the Following Treatment Goals : Home with home health. Discharge Location  Discharge Placement: Home with home health    Reason for Admission:  Acute CVA                   RUR Score:           18%              Plan for utilizing home health:    TBD. Per patient, he recently began [facility] home health SN and PT but could not name the agency. PCP: First and Last name:  Guera Reis MD   Name of Practice:    Are you a current patient: Yes    Approximate date of last visit: July 2021   Can you participate in a virtual visit with your PCP:                     Current Advanced Directive/Advance Care Plan: Full Code      Healthcare Decision Maker:   Click here to complete [address] including selection of the Healthcare Decision Maker Relationship (ie \"Primary\")                           Transition of Care Plan:                    CM met with patient. Patient lives with his wife and stepdaughter. In addition, he has 3 adult daughters. Patient was ambulatory prior to admission. and expects return to independent functioning.  Patient confirmed PCP, [facility] insurance, and prescription coverage. Previous home health : see above note. Previous IPR/ SNF rehab : [facility] about 2 to 3 years ago.   DME : cane, walker

## 2021-08-15 NOTE — PROGRESS NOTES
TRANSFER - IN REPORT:    Verbal report received from Daija(name) on [facility]  being received from ER(unit) for routine progression of care      Report consisted of patients Situation, Background, Assessment and   Recommendations(SBAR). Information from the following report(s) SBAR, [facility], [facility] and Recent Results was reviewed with the receiving nurse. Opportunity for questions and clarification was provided. Assessment completed upon patients arrival to unit and care assumed.

## 2021-08-15 NOTE — PROGRESS NOTES
Progress Note          Pt Name  Flor De La Paz   Date of Birth 1945   Medical Record Number  [id number]      Age  77 y.o. PCP Melvin Cole MD   Admit date:  8/14/2021    Room Number  656/01  @ [facility]   Date of Service  8/15/2021     Admission Diagnoses:  <principal problem not specified>     History of present illness (copied from H&P)  \" Flor De La Paz is a 77 y.o. male who presents with right facial droop, numbness     Patient is a 69-year-old male h/o DM, HTN, CHF who presents today right facial droop and weakness. RIGHT sided facial droop and numbness starting approx midnight. Pt c/o \"facial swelling\" that has gotten worse since midnight. Pt states also has numbness from hip down to feet. Patient stated he has chronic right legg weakness with numbness from previous procedure couple years back but now worse. pt said he is taking baby aspirin every day. Not on other  Anticoagulation. The patient denies any fever, chills, chest pain, cough, congestion, recent illness, palpitations, or dysuria. \"     Assessment and plan:   Acute CVA -MRI result pending   Appreciate guidance from Dr. Torres Lung neurologist   Will continue ASA + Plavix - pending further assay   Plan for possible further escalation of anticoagulation if Plavix remains subtherapeutic     JULIO CESAR on CKD Stage IIIa -Improving creatinine   Continue to monitor with daily BMP     HTN  CAD   S/ MI   S/p CABG  AS + MR   CHF hx   antiHTN meds were on hold. Resume antiHTN meds   ASA to continue     Hyperlipidemia -on high dose Statin Rx   Continue Lipitor as is     Body mass index is 35.1 kg/m².  -          CODE STATUS   Full   Functional Status  Pt resides in [address] [facility] with his wife and daughter     Surrogate decision maker:  Pt's wife      Prophylaxis   Lovenox   Discharge Plan:   PT, OT, RN,      Misc   Benefit:  Payor: Olivia Alan / Plan: [address] [place] [address] HMO / Product Type: Managed Care Medicare /    Isolation : There are currently no Active Isolations   ADT status:  INPATIENT      Query   None noted today    Prognosis      Social issues  Date  Comment     8/15/21  12:25 PM No family or visitor here with the patient today                     Subjective Data     \"I still feel numb on this side \"  Review of Systems - History obtained from the patient  Respiratory ROS: no cough, shortness of breath, or wheezing  Cardiovascular ROS: no chest pain or dyspnea on exertion  Neurological ROS: positive for - Hypoasthesia of the RIGHT LE     Objective Data       Comments  Pleasant gentleman sitting on bedside chair in no distress      Patient Vitals for the past 24 hrs:   BP   08/15/21 0944 (!) 120/94   08/15/21 0840 (!) 129/107   08/15/21 0600 (!) 121/58   08/15/21 0200 113/70   08/15/21 0000 132/79   08/14/21 2230 (!) 152/74   08/14/21 2130 (!) 155/70   08/14/21 2000 (!) 131/52   08/14/21 1930 (!) 161/75   08/14/21 1900 (!) 131/46   08/14/21 1830 119/64   08/14/21 1800 130/70   08/14/21 1730 (!) 146/62   08/14/21 1700 (!) 146/63   08/14/21 1630 137/67   08/14/21 1600 134/68   08/14/21 1530 (!) 123/54   08/14/21 1500 (!) 124/55   08/14/21 1348 (!) 158/66      Patient Vitals for the past 24 hrs:   Pulse   08/15/21 1000 68   08/15/21 0944 72   08/15/21 0840 76   08/15/21 0802 74   08/15/21 0800 73   08/15/21 0611 61   08/15/21 0600 61   08/15/21 0406 70   08/15/21 0226 (!) 56   08/15/21 0200 69   08/15/21 0000 (!) 55   08/14/21 2230 (!) 56   08/14/21 2130 (!) 56   08/14/21 2000 (!) 57   08/14/21 1930 67   08/14/21 1900 (!) 57   08/14/21 1830 (!) 55   08/14/21 1800 (!) 56   08/14/21 1730 (!) 57   08/14/21 1700 (!) 56   08/14/21 1630 60   08/14/21 1600 60   08/14/21 1530 (!) 58   08/14/21 1500 60   08/14/21 1348 (!) 54      Patient Vitals for the past 24 hrs:   Resp   08/15/21 0944 17   08/15/21 0600 24   08/15/21 0200 20   08/15/21 0000 20   08/14/21 2230 14   08/14/21 2130 14   08/14/21 2000 19   08/14/21 1930 22   08/14/21 1900 23 08/14/21 1830 20   08/14/21 1800 15   08/14/21 1730 17   08/14/21 1700 23   08/14/21 1630 20   08/14/21 1600 11   08/14/21 1530 23   08/14/21 1500 23   08/14/21 1348 16      Patient Vitals for the past 24 hrs:   Temp   08/15/21 0944 98 °F (36.7 °C)   08/15/21 0600 98 °F (36.7 °C)   08/15/21 0200 97.6 °F (36.4 °C)   08/15/21 0000 97.4 °F (36.3 °C)   08/14/21 2230 97.6 °F (36.4 °C)   08/14/21 2130 97.1 °F (36.2 °C)   08/14/21 1348 98.1 °F (36.7 °C)        SpO2 Readings from Last 6 Encounters:   08/15/21 98%   08/03/20 95%   01/26/20 99%   12/16/12 97%   08/11/12 97%   04/06/12 98%          O2 Device: None (Room air) Body mass index is 35.1 kg/m². -  Wt Readings from Last 10 Encounters:   08/14/21 104.7 kg (230 lb 13.2 oz)   08/03/20 104.3 kg (229 lb 15 oz)   01/26/20 109.8 kg (242 lb)   12/15/12 121.1 kg (267 lb)   08/11/12 113.4 kg (250 lb)   04/06/12 115.6 kg (254 lb 12.8 oz)   07/22/11 116.6 kg (257 lb)   05/06/11 108.9 kg (240 lb)   02/17/11 119.7 kg (263 lb 14.4 oz)   02/18/11 115.7 kg (255 lb)        Intake/Output Summary (Last 24 hours) at 8/15/2021 1206  Last data filed at 8/15/2021 0800  Gross per 24 hour   Intake --   Output 400 ml   Net -400 ml         Physical Exam:             General:  Alert, cooperative,   well noursished,   well developed,   appears stated age    Ears/Eyes:  Hearing intact  Sclera anicteric.    Pupils equal   Mouth/Throat:  Mucous membranes normal pink and moist     Neck:     Lungs:  Chest excursion symmetrical   Auscultation B/L Symmetrical with   Vesicular breath sounds     No Crepitations noted          CVS:  Regular rate and rhythm   AS  murmur,   No click, rub or gallop  S1 normal   S2 normal      Abdomen:  Obese  Soft, non-tender  Bowel sounds normal     Extremities:    No cyanosis, jaundice  3+ edema noted with signs of chronicity   No sign of DVT/cord like lesion on palpation  No sign of acute trauma    Skin:    Skin color, texture, turgor normal. no acute rash or lesions Lymph nodes:     Musculoskeletal Muscle bulk B/L symmetrical   Neuro Cranial nerves are intact,   motor movement b/l symmetrical,   Impaired sensation on the RLE    Psych:  Alert and oriented,   normal mood & affect          Medications reviewed     Current Facility-Administered Medications   Medication Dose Route Frequency    allopurinoL (ZYLOPRIM) tablet 100 mg  100 mg Oral DAILY    atorvastatin (LIPITOR) tablet 80 mg  80 mg Oral DAILY    [Held by provider] bumetanide (BUMEX) tablet 2 mg  2 mg Oral BID    carvediloL (COREG) tablet 6.25 mg  6.25 mg Oral BID WITH MEALS    [Held by provider] lisinopriL (PRINIVIL, ZESTRIL) tablet 5 mg  5 mg Oral DAILY    pantoprazole (PROTONIX) tablet 40 mg  40 mg Oral DAILY    acetaminophen (TYLENOL) tablet 650 mg  650 mg Oral Q4H PRN    Or    acetaminophen (TYLENOL) solution 650 mg  650 mg Per NG tube Q4H PRN    Or    acetaminophen (TYLENOL) suppository 650 mg  650 mg Rectal Q4H PRN    aspirin tablet 325 mg  325 mg Oral DAILY    labetaloL (NORMODYNE;TRANDATE) injection 5 mg  5 mg IntraVENous Q10MIN PRN    enoxaparin (LOVENOX) injection 40 mg  40 mg SubCUTAneous Q24H    glucose chewable tablet 16 g  4 Tablet Oral PRN    dextrose (D50W) injection syrg 12.5-25 g  25-50 mL IntraVENous PRN    glucagon (GLUCAGEN) injection 1 mg  1 mg IntraMUSCular PRN    insulin NPH (NOVOLIN N, HUMULIN N) injection 20 Units  20 Units SubCUTAneous ACB&D    insulin lispro (HUMALOG) injection   SubCUTAneous AC&HS    clopidogreL (PLAVIX) tablet 75 mg  75 mg Oral DAILY       Relevant other informations: Other medical conditions listed in [facility] problem list section; all of these and other pertinent data were taken into consideration when treatment plan is developed and customized to this patient's unique overall circumstances and needs. We have reviewed available old medical records within the constraints of this admission process. Data Review:   Recent Days:   All Micro Results     None          Recent Labs     08/15/21  0420 08/14/21  1418   WBC 3.4* 4.0*   HGB 10.7* 11.1*   HCT 33.5* 34.3*    195     Recent Labs     08/15/21  0420 08/14/21  1418    131*   K 4.2 4.8    102   CO2 28 26   GLU 92 111*   BUN 45* 54*   CREA 2.00* 2.80*   CA 8.4* 8.8   MG 1.5*  --    PHOS 2.9  --    ALB 3.0* 3.6   TBILI 0.7 0.9   ALT 11* 14   INR  --  1.1      Lab Results   Component Value Date/Time    TSH 1.27 07/21/2011 03:15 AM            Care Plan discussed with:Patient/Family and Nurse   Other medical conditions are listed in the active hospital problem list section; these and other pertinent data were taken into consideration when the treatment plan was developed and customized to this patient's unique overall circumstances and needs. High complexity decision making was performed for this patient who is at high risk for decompensation with multiple organ involvement. Today total floor/unit time was 35 minutes while caring for this patient and greater than 50% of that time was spent with patient (and/or family) coordinating patients clinical issues; this includes time spent during multidisciplinary rounds.         Kylee Wilde MD MPH FACP    8/15/2021

## 2021-08-15 NOTE — ROUTINE PROCESS
Bedside shift change report given to Milena (oncoming nurse) by Deana Gallegos (offgoing nurse). Report included the following information SBAR, ED Summary, MAR, Recent Results and Cardiac Rhythm NSR.

## 2021-08-15 NOTE — CONSULTS
SUBJECTIVE  Carlos Dominguez is a 77 y.o. male  with hypertension, diabetes, congestive heart failure, who has significant intracranial atherosclerotic vascular disease, presenting with right-sided facial weakness and numbness. Clinically he reports improvement in the strength of the facial muscles in the right but it still does not feel completely back to normal.  No other complaints. EXAM  Exam:  Visit Vitals  BP (!) 120/94   Pulse 68   Temp 98 °F (36.7 °C)   Resp 17   Ht 5' 8\" (1.727 m)   Wt 230 lb 13.2 oz (104.7 kg)   SpO2 98%   BMI 35.10 kg/m²     Gen:  CV: RRR  Lungs: non labored breathing  Abd: non distending  Neuro: A&O x 3, no dysarthria or aphasia  CN II-XII: PERRL, EOMI, subtle facial asymmetry with mild weakness on the right along with reduced sensation on the right face tongue/palate midline  Motor: strength 5/5 all four ext except for proximal weakness in the right lower extremity which is chronic  Sensory: intact to LT  Gait: Walks with a walker, favors right leg    LABS/ IMAGING  Aspirin testing P2 Y 12 are subtherapeutic    Lab Results   Component Value Date/Time    Cholesterol, total 119 08/15/2021 04:20 AM    HDL Cholesterol 26 08/15/2021 04:20 AM    LDL, calculated 65.4 08/15/2021 04:20 AM    VLDL, calculated 27.6 08/15/2021 04:20 AM    Triglyceride 138 08/15/2021 04:20 AM    CHOL/HDL Ratio 4.6 08/15/2021 04:20 AM     Lab Results   Component Value Date/Time    Hemoglobin A1c 6.7 (H) 08/15/2021 04:20 AM         ASSESSMENT  Hospital Problems  Date Reviewed: 8/3/2020        Codes Class Noted POA    Weakness on right side of face ICD-10-CM: R29.810  ICD-9-CM: 781.94  8/15/2021 Unknown        CVA (cerebral vascular accident) [facility]) ICD-10-CM: I63.9  ICD-9-CM: 434.91  8/14/2021 Unknown               PLAN  Suspect a small vessel lacunar infarct in the brainstem/pontine region.   Risk factors include diabetes, hypertension  Awaiting MRI  He has been getting aspirin 325 mg and had a Plavix load yesterday but both aspirin test and Plavix assay are subtherapeutic  Will give extra dose of aspirin 325 mg today and repeat these levels tomorrow. If they remain subtherapeutic, will need to consider alternatives such as Brilinta  Follow-up on echocardiogram  His LDL is at goal on atorvastatin  PT/OT evaluation.   He has chronic right lower extremity weakness along with edema    Terence Santiago MD

## 2021-08-15 NOTE — PROGRESS NOTES
Problem: Diabetes Self-Management  Goal: *Disease process and treatment process  Description: Define diabetes and identify own type of diabetes; list 3 options for treating diabetes. Outcome: Progressing Towards Goal  Goal: *Incorporating nutritional management into lifestyle  Description: Describe effect of type, amount and timing of food on blood glucose; list 3 methods for planning meals. Outcome: Progressing Towards Goal  Goal: *Incorporating physical activity into lifestyle  Description: State effect of exercise on blood glucose levels. Outcome: Progressing Towards Goal  Goal: *Developing strategies to promote health/change behavior  Description: Define the ABC's of diabetes; identify appropriate screenings, schedule and personal plan for screenings. Outcome: Progressing Towards Goal  Goal: *Using medications safely  Description: State effect of diabetes medications on diabetes; name diabetes medication taking, action and side effects. Outcome: Progressing Towards Goal  Goal: *Monitoring blood glucose, interpreting and using results  Description: Identify recommended blood glucose targets  and personal targets. Outcome: Progressing Towards Goal  Goal: *Prevention, detection, treatment of acute complications  Description: List symptoms of hyper- and hypoglycemia; describe how to treat low blood sugar and actions for lowering  high blood glucose level. Outcome: Progressing Towards Goal  Goal: *Prevention, detection and treatment of chronic complications  Description: Define the natural course of diabetes and describe the relationship of blood glucose levels to long term complications of diabetes.   Outcome: Progressing Towards Goal     Problem: Patient Education: Go to Patient Education Activity  Goal: Patient/Family Education  Outcome: Progressing Towards Goal     Problem: Falls - Risk of  Goal: *Absence of Falls  Description: Document [place] Pyo Fall Risk and appropriate interventions in the flowsheet.   Outcome: Progressing Towards Goal  Note: Fall Risk Interventions:  Mobility Interventions: Communicate number of staff needed for ambulation/transfer, Assess mobility with egress test, Patient to call before getting OOB, PT Consult for mobility concerns         Medication Interventions: Evaluate medications/consider consulting pharmacy, Patient to call before getting OOB         History of Falls Interventions: Consult care management for discharge planning, Door open when patient unattended         Problem: Patient Education: Go to Patient Education Activity  Goal: Patient/Family Education  Outcome: Progressing Towards Goal     Problem: Patient Education: Go to Patient Education Activity  Goal: Patient/Family Education  Outcome: Progressing Towards Goal     Problem: TIA/CVA Stroke: 0-24 hours  Goal: Activity/Safety  Outcome: Progressing Towards Goal  Goal: Consults, if ordered  Outcome: Progressing Towards Goal  Goal: Diagnostic Test/Procedures  Outcome: Progressing Towards Goal  Goal: Discharge Planning  Outcome: Progressing Towards Goal  Goal: Medications  Outcome: Progressing Towards Goal  Goal: Respiratory  Outcome: Progressing Towards Goal  Goal: Treatments/Interventions/Procedures  Outcome: Progressing Towards Goal  Goal: Minimize risk of bleeding post-thrombolytic infusion  Outcome: Progressing Towards Goal  Goal: Monitor for complications post-thrombolytic infusion  Outcome: Progressing Towards Goal  Goal: Psychosocial  Outcome: Progressing Towards Goal  Goal: *Hemodynamically stable  Outcome: Progressing Towards Goal

## 2021-08-15 NOTE — CONSULTS
[address]    Name:  Marquez Peng  MR#:  [id number]  :  1945  ACCOUNT #:  [de-identified]  DATE OF SERVICE:  2021    REQUESTING PHYSICIAN:  Dr. Cam Elias.    REASON FOR EVALUATION:  Possible stroke. HISTORY OF PRESENT ILLNESS:  The patient is a 76-year-old male with past medical history of diabetes, hypertension, congestive heart failure, who was feeling fine when he went to bed last night and when he woke up this morning, he noticed that the right side of the face was feeling numb and it was droopy. It felt as if it was swollen and he was drooling. He presented to the emergency department and was admitted for further workup. He also reports right lower extremity weakness and some pain, which has been present for many years and is not new. At baseline, he uses a cane or a walker as needed. Denies any associated headache, changes in vision, speech, chest pain, shortness of breath, palpitations, nausea, or vomiting. PAST MEDICAL HISTORY:  As mentioned above. REVIEW OF SYSTEMS:  Negative except as mentioned in the HPI. HOME MEDICATIONS:  Aspirin 81 mg daily, lisinopril, magnesium, metformin, pantoprazole, atorvastatin 80 mg daily, insulin, carvedilol. ALLERGIES:  NONE. SOCIAL HISTORY:  No history of recent smoking, used to smoke in the past.  No alcohol use. PHYSICAL EXAMINATION:  GENERAL:  On examination, the patient is sitting in bed in no acute distress. VITAL SIGNS:  Blood pressure 146/62, temperature is 98.1, pulse is 57. NEUROLOGIC:  Speech is mildly slurred. Comprehension is normal.  Pupils are equal, round, reactive. Visual fields appear intact. He has central type of right-sided facial weakness. Facial sensation is impaired to light touch on the right when compared to the left. He is able to close his eyes symmetrically and is able to raise his eyebrows bilaterally in a symmetric fashion.   Muscle tone and bulk normal.  Strength normal in both upper extremities. He has 5/5 strength in the left lower extremity, but has difficulty raising his right leg against gravity, and strength appears to be 4/5. DTRs hypoactive. Toes downgoing. Sensation impaired to vibration at the ankles. Gait exam is deferred. HEART:  Regular rate and rhythm. CHEST:  Clear. ABDOMEN:  Soft, nontender. Positive bowel sounds. EXTREMITIES:  No edema. LABORATORY DATA:  CBC with WBC 4.0, hemoglobin 11.1, hematocrit is 34.3, platelets 061. Chemistry:  Sodium 131, potassium 4.8, BUN 54, creatinine 2.80. CTA of the head and neck was reviewed independently. There are areas of moderate to severe stenosis seen in the right intracranial ICA and moderate stenosis in the left intracranial ICA. There are multiple areas of stenosis seen in the diminutive left vertebral artery. There is a small and irregular basilar artery. ASSESSMENT AND PLAN:  A 61-year-old male with hypertension, diabetes, congestive heart failure, who has significant intracranial atherosclerotic vascular disease, presenting with  right-sided facial weakness and numbness. The symptoms were acute in onset and he realized them when he woke up this morning. Clinically, it appears to be a central type of weakness and suspect a small vessel lacunar infarct. Continue aspirin and statin. We will complete stroke workup including MRI scan of the brain, echocardiogram, and lipid panel. We will load with Plavix and maintain dual-antiplatelet therapy due to the degree of intracranial atherosclerotic vascular disease. We will request Speech Therapy, Physical Therapy, and Occupational Therapy evaluation. We will follow along. Thank you for this consultation.       Sherly Mijares MD      [id number]  D:  08/14/2021 18:17  T:  08/14/2021 21:23  JOB #:  [id number]

## 2021-08-15 NOTE — PROGRESS NOTES
Problem: Self Care Deficits Care Plan (Adult)  Goal: *Acute Goals and Plan of Care (Insert Text)  Description:   FUNCTIONAL STATUS PRIOR TO ADMISSION: Patient was modified independent using a rolling walker for functional mobility. HOME SUPPORT: The patient lived with spouse and daughter. Occupational Therapy Goals  Initiated 8/15/2021  1. Patient will perform toilet transfers with modified independence within 7 day(s). 2.  Patient will perform all aspects of toileting with modified independence within 7 day(s). 3.  Patient will utilize energy conservation techniques during functional activities with verbal cues within 7 day(s). Outcome: Progressing Towards Goal   OCCUPATIONAL THERAPY EVALUATION  Patient: Adrienne Forrest (89 y.o. male)  Date: 8/15/2021  Primary Diagnosis: CVA (cerebral vascular accident) [facility]) [I63.9]        Precautions:       ASSESSMENT  Based on the objective data described below, the patient presents with close to baseline with self-care, however, pt  presents with R foot drop. Per chart, suspect a small vessel lacunar infarct in the brainstem/pontine region. No deficits noted with UE sensation, coordination for strength. Seated in chair, pt is able to don both lace up shoes with increase time. Concerned with pt's safety accessing bathroom using RW d/t his foot drop. Will con't to follow and address listed goals. Anticipate pt would benefit from OP Physical Therapy at discharge. Current Level of Function Impacting Discharge (ADLs/self-care): SBA, R foot drop    Functional Outcome Measure: The patient scored 66/66 on the Fugl-Stallings outcome measure which is indicative of no impairment. Other factors to consider for discharge: foot drop, AFO? Patient will benefit from skilled therapy intervention to address the above noted impairments.        PLAN :  Recommendations and Planned Interventions: self care training, functional mobility training, balance training, patient education, and home safety training    Frequency/Duration: Patient will be followed by occupational therapy 4 times a week to address goals. Recommendation for discharge: (in order for the patient to meet his/her long term goals)  No skilled occupational therapy/ follow up rehabilitation needs identified at this time. This discharge recommendation:  A follow-up discussion with the attending provider and/or case management is planned    IF patient discharges home will need the following DME: none       SUBJECTIVE:   Patient stated This is new (foot drop).     OBJECTIVE DATA SUMMARY:   HISTORY:   Past Medical History:   Diagnosis Date    Arthritis     CAD (coronary artery disease)     CABG    Diabetes ([facility] [address]     Heart failure ([facility] [address]     Hypertension     Other ill-defined conditions(799.89)     kidney stones 1990's     Past Surgical History:   Procedure Laterality Date    CARDIAC SURG PROCEDURE UNLIST      endarterectomy, CABG    HX CORONARY ARTERY BYPASS GRAFT      HX ORTHOPAEDIC      right knee surgery 2009       Expanded or extensive additional review of patient history:     Home Situation  Home Environment: Trailer/mobile home  # Steps to Enter: 3  Rails to Enter: Yes  Hand Rails : Bilateral  One/Two Story Residence: One story  Living Alone: No  Support Systems: Spouse/Significant Other/Partner, Family member(s)  Current DME Used/Available at Home: Walker, rolling, Crutches, Cane, straight  Tub or Shower Type: Shower    Hand dominance: Right    EXAMINATION OF PERFORMANCE DEFICITS:  Cognitive/Behavioral Status:  Neurologic State: Alert  Orientation Level: Oriented X4  Cognition: Appropriate decision making        Safety/Judgement: Awareness of environment    Skin: intact    Edema: R LE    Hearing:       Vision/Perceptual:    Tracking: Able to track stimulus in all quadrants w/o difficulty                           Corrective Lenses: Glasses    Range of Motion:    AROM: (P) Generally decreased, functional (Decreased right hip flexion, minimal ankle DF)                         Strength:    Strength: (P) Generally decreased, functional (Right drop foot, right hip>knee weakness)                Coordination:  Coordination: Within functional limits  Fine Motor Skills-Upper: Left Intact; Right Intact    Gross Motor Skills-Upper: Left Intact; Right Intact    Tone & Sensation:    Tone: Normal  Sensation: (P) Intact                      Balance:  Sitting: (P) Intact  Standing: Intact; With support  Standing - Static: (P) Good;Constant support  Standing - Dynamic : (P) Fair;Constant support    Functional Mobility and Transfers for ADLs:  Bed Mobility:       Transfers:  Sit to Stand: (P) Stand-by assistance  Stand to Sit: (P) Stand-by assistance  Bed to Chair: Stand-by assistance  Bathroom Mobility: Stand-by assistance    ADL Assessment:  Feeding: Independent    Oral Facial Hygiene/Grooming: Independent    Bathing: Modified independent; Additional time    Upper Body Dressing: Setup    Lower Body Dressing: Stand-by assistance    Toileting: Stand by assistance                ADL Intervention and task modifications:         Cognitive Retraining  Safety/Judgement: Awareness of environment    Therapeutic Exercise:     Functional Measure:  Fugl-Stallings Assessment of Motor Recovery after Stroke:     Reflex Activity  Flexors/Biceps/Fingers: Can be elicited  Extensors/Triceps: Can be elicited  Reflex Subtotal: 4    Volitional Movement Within Synergies  Shoulder Retraction: Full  Shoulder Elevation: Full  Shoulder Abduction (90 degrees): Full  Shoulder External Rotation: Full  Elbow Flexion: Full  Forearm Supination: Full  Shoulder Adduction/Internal Rotation: Full  Elbow Extension: Full  Forearm Pronation: Full  Subtotal: 18    Volitional Movement Mixing Synergies  Hand to Lumbar Spine: Full  Shoulder Flexion (0-90 degrees): Full  Pronation-Supination: Full  Subtotal: 6    Volitional Movement With Little or No Synergy  Shoulder Abduction (0-90 degrees): Full  Shoulder Flexion ( degrees): Full  Pronation/Supination: Full  Subtotal : 6    Normal Reflex Activity  Biceps, Triceps, Finger Flexors: Full  Subtotal : 2    Upper Extremity Total   Upper Extremity Total: 36    Wrist  Stability at 15 Degree Dorsiflexion: Full  Repeated Dorsiflexion/ Volar Flexion: Full  Stability at 15 Degree Dorsiflexion: Full  Repeated Dorsiflexion/ Volar Flexion: Full  Circumduction: Full  Wrist Total: 10    Hand  Mass Flexion: Full  Mass Extension: Full  Grasp A: Full  Grasp B: Full  Grasp C: Full  Grasp D: Full  Grasp E: Full  Hand Total: 14    Coordination/Speed  Tremor: None  Dysmetria: None  Time: <1s  Coordination/Speed Total : 6    Total A-D  Total A-D (Motor Function): 66/66         This is a reliable/valid measure of arm function after a neurological event. It has established value to characterize functional status and for measuring spontaneous and therapy-induced recovery; tests proximal and distal motor functions. Fugl-Stallings Assessment - UE scores recorded between five and 30 days post neurologic event can be used to predict UE recovery at six months post neurologic event. Severe = 0-21 points   Moderately Severe = 22-33 points   Moderate = 34-47 points   Mild = 48-66 points  JARVIS Bear, NIKA Hathaway, & CHITO Hernandez (1992). Measurement of motor recovery after stroke: Outcome assessment and sample size requirements.  Stroke, 23, pp. 8622-9119.   --------------------------------------------------------------------------------------------------------------------------------------------------------------------  MCID:  Stroke:   Katrinka Bouquet et al, 2001; n = 171; mean age 79 (6) years; assessed within 16 (12) days of stroke, Acute Stroke)  FMA Motor Scores from Admission to Discharge    10 point increase in FMA Upper Extremity = 1.5 change in discharge FIM    10 point increase in FMA Lower Extremity = 1.9 change in discharge FIM  MDC:   Stroke:   Low Quintero et al, 2008, n = 14, mean age = 59.9 (14.6) years, assessed on average 14 (6.5) months post stroke, Chronic Stroke)    FMA = 5.2 points for the Upper Extremity portion of the assessment        Occupational Therapy Evaluation Charge Determination   History Examination Decision-Making   LOW Complexity : Brief history review  LOW Complexity : 1-3 performance deficits relating to physical, cognitive , or psychosocial skils that result in activity limitations and / or participation restrictions  LOW Complexity : No comorbidities that affect functional and no verbal or physical assistance needed to complete eval tasks       Based on the above components, the patient evaluation is determined to be of the following complexity level: LOW   Pain Rating:  No c/o pain    Activity Tolerance:   Good    After treatment patient left in no apparent distress:    Sitting in chair and Call bell within reach    COMMUNICATION/EDUCATION:   The patients plan of care was discussed with: Physical therapist and Registered nurse. Home safety education was provided and the patient/caregiver indicated understanding., Patient/family have participated as able in goal setting and plan of care. , and Patient/family agree to work toward stated goals and plan of care. This patients plan of care is appropriate for delegation to [facility].     Thank you for this referral.  Baron Arsh OTR/L  Time Calculation: 27 mins

## 2021-08-15 NOTE — ED NOTES
TRANSFER - OUT REPORT:    Verbal report given to Staff, RN(name) on Tee Rick  being transferred to NSTU(unit) for routine progression of care       Report consisted of patients Situation, Background, Assessment and   Recommendations(SBAR). Information from the following report(s) SBAR, MAR and Recent Results was reviewed with the receiving nurse. Lines:   Peripheral IV 08/14/21 Right Antecubital (Active)   Site Assessment Clean, dry, & intact 08/14/21 1403   Phlebitis Assessment 0 08/14/21 1403   Infiltration Assessment 0 08/14/21 1403   Dressing Status Clean, dry, & intact 08/14/21 1403   Hub Color/Line Status Pink 08/14/21 1403        Opportunity for questions and clarification was provided.       Patient transported with:   Monitor  Registered Nurse

## 2021-08-16 ENCOUNTER — APPOINTMENT (OUTPATIENT)
Dept: MRI IMAGING | Age: 76
DRG: 069 | End: 2021-08-16
Attending: HOSPITALIST
Payer: MEDICARE

## 2021-08-16 LAB
ANION GAP SERPL CALC-SCNC: 4 MMOL/L (ref 5–15)
ASPIRIN TEST, ASPIRN: 401 ARU
BUN SERPL-MCNC: 41 MG/DL (ref 6–20)
BUN/CREAT SERPL: 23 (ref 12–20)
CALCIUM SERPL-MCNC: 8.8 MG/DL (ref 8.5–10.1)
CHLORIDE SERPL-SCNC: 103 MMOL/L (ref 97–108)
CO2 SERPL-SCNC: 27 MMOL/L (ref 21–32)
CREAT SERPL-MCNC: 1.77 MG/DL (ref 0.7–1.3)
GLUCOSE BLD STRIP.AUTO-MCNC: 108 MG/DL (ref 65–117)
GLUCOSE BLD STRIP.AUTO-MCNC: 122 MG/DL (ref 65–117)
GLUCOSE BLD STRIP.AUTO-MCNC: 139 MG/DL (ref 65–117)
GLUCOSE BLD STRIP.AUTO-MCNC: 149 MG/DL (ref 65–117)
GLUCOSE BLD STRIP.AUTO-MCNC: 167 MG/DL (ref 65–117)
GLUCOSE BLD STRIP.AUTO-MCNC: 66 MG/DL (ref 65–117)
GLUCOSE SERPL-MCNC: 81 MG/DL (ref 65–100)
P2Y12 PLT RESPONSE,PPPR: 282 PRU (ref 194–418)
POTASSIUM SERPL-SCNC: 4.2 MMOL/L (ref 3.5–5.1)
SERVICE CMNT-IMP: ABNORMAL
SERVICE CMNT-IMP: NORMAL
SERVICE CMNT-IMP: NORMAL
SODIUM SERPL-SCNC: 134 MMOL/L (ref 136–145)

## 2021-08-16 PROCEDURE — 82962 GLUCOSE BLOOD TEST: CPT

## 2021-08-16 PROCEDURE — 74011250637 HC RX REV CODE- 250/637: Performed by: HOSPITALIST

## 2021-08-16 PROCEDURE — 36415 COLL VENOUS BLD VENIPUNCTURE: CPT

## 2021-08-16 PROCEDURE — 70551 MRI BRAIN STEM W/O DYE: CPT

## 2021-08-16 PROCEDURE — 85576 BLOOD PLATELET AGGREGATION: CPT

## 2021-08-16 PROCEDURE — 80048 BASIC METABOLIC PNL TOTAL CA: CPT

## 2021-08-16 PROCEDURE — 97535 SELF CARE MNGMENT TRAINING: CPT

## 2021-08-16 PROCEDURE — 92610 EVALUATE SWALLOWING FUNCTION: CPT | Performed by: SPEECH-LANGUAGE PATHOLOGIST

## 2021-08-16 PROCEDURE — 97116 GAIT TRAINING THERAPY: CPT

## 2021-08-16 PROCEDURE — 74011250637 HC RX REV CODE- 250/637: Performed by: PSYCHIATRY & NEUROLOGY

## 2021-08-16 PROCEDURE — 97530 THERAPEUTIC ACTIVITIES: CPT

## 2021-08-16 PROCEDURE — 74011250636 HC RX REV CODE- 250/636: Performed by: HOSPITALIST

## 2021-08-16 PROCEDURE — 99232 SBSQ HOSP IP/OBS MODERATE 35: CPT | Performed by: NURSE PRACTITIONER

## 2021-08-16 PROCEDURE — 65660000000 HC RM CCU STEPDOWN

## 2021-08-16 RX ADMIN — ALLOPURINOL 100 MG: 100 TABLET ORAL at 09:08

## 2021-08-16 RX ADMIN — ENOXAPARIN SODIUM 40 MG: 40 INJECTION SUBCUTANEOUS at 09:10

## 2021-08-16 RX ADMIN — CLOPIDOGREL BISULFATE 75 MG: 75 TABLET ORAL at 09:08

## 2021-08-16 RX ADMIN — PANTOPRAZOLE SODIUM 40 MG: 40 TABLET, DELAYED RELEASE ORAL at 09:08

## 2021-08-16 RX ADMIN — CARVEDILOL 6.25 MG: 6.25 TABLET, FILM COATED ORAL at 17:15

## 2021-08-16 RX ADMIN — ASPIRIN 325 MG ORAL TABLET 325 MG: 325 PILL ORAL at 09:10

## 2021-08-16 RX ADMIN — ATORVASTATIN CALCIUM 80 MG: 40 TABLET, FILM COATED ORAL at 09:10

## 2021-08-16 RX ADMIN — CARVEDILOL 6.25 MG: 6.25 TABLET, FILM COATED ORAL at 09:08

## 2021-08-16 NOTE — PROGRESS NOTES
SPEECH PATHOLOGY BEDSIDE SWALLOW EVALUATION/DISCHARGE  Patient: Janusz Lucas (56 y.o. male)  Date: 8/16/2021  Primary Diagnosis: CVA (cerebral vascular accident) [facility]) [I63.9]       Precautions:        ASSESSMENT :  Based on the objective data described below, the patient presents with mildly slow mastication of solids. Suspect prolonged mastication related to missing teeth. While patient demonstrated throat clear x 1 while talking no overt s/s aspiration with PO trials. Patient reports no change in swallow function or speech/language skills. Given presentation this date recommend easy to chew diet due to missing teeth. No further SLP treatment warranted at this time. Skilled acute therapy provided by a speech-language pathologist is not indicated at this time. PLAN :  Recommendations:  Easy to chew diet  No further SLP treatment warranted  Discharge Recommendations: None     SUBJECTIVE:   Patient stated I'm so much better. Charo Curtis RN approved visit. CT negative for acute event. MRI not yet completed.     OBJECTIVE:     Past Medical History:   Diagnosis Date    Arthritis     CAD (coronary artery disease)     CABG    Diabetes ([facility] [address]     Heart failure (HCC)     Hypertension     Other ill-defined conditions(799.89)     kidney stones 1990's     Past Surgical History:   Procedure Laterality Date    CARDIAC SURG PROCEDURE UNLIST      endarterectomy, CABG    HX CORONARY ARTERY BYPASS GRAFT      HX ORTHOPAEDIC      right knee surgery 2009     Prior Level of Function/Home Situation:   Home Situation  Home Environment: Trailer/mobile home  # Steps to Enter: 3  Rails to Enter: Yes  Hand Rails : Bilateral  One/Two Story Residence: One story  Living Alone: No  Support Systems: Spouse/Significant Other/Partner, Family member(s)  Patient Expects to be Discharged to[de-identified] Rehabilitation facility  Current DME Used/Available at Home: Walker, rolling, Crutches, Cane, straight  Tub or Shower Type: Shower  Diet prior to admission: Soft to regular per patient report  Current Diet:  Regular   Cognitive and Communication Status:  Neurologic State: Alert  Orientation Level: Oriented to person, Oriented to place, Oriented to situation, Oriented to time  Cognition: Follows commands  Perception: Appears intact  Perseveration: No perseveration noted  Safety/Judgement: Awareness of environment  Oral Assessment:  Oral Assessment  Labial: No impairment  Dentition: Limited;Natural;Poor  Oral Hygiene: Moist oeal mucosa  Lingual: No impairment  Velum: Unable to visualize  Mandible: No impairment  P.O. Trials:  Patient Position: Upright in bed  Vocal quality prior to P.O.: No impairment  Consistency Presented: Solid; Thin liquid  How Presented: Self-fed/presented;Cup/sip;Spoon;Straw     Bolus Acceptance: No impairment  Bolus Formation/Control: Impaired  Type of Impairment: Delayed;Mastication  Propulsion: No impairment  Oral Residue: None  Initiation of Swallow: No impairment  Laryngeal Elevation: Functional  Aspiration Signs/Symptoms: None  Pharyngeal Phase Characteristics: No impairment, issues, or problems                    NOMS:   The NOMS functional outcome measure was used to quantify this patient's level of swallowing impairment. Based on the NOMS, the patient was determined to be at level 6 for swallow function     [facility]:  Level 1 (CN): NPO  Level 2 (CM): NPO but takes consistency in therapy  Level 3 (CL): Takes less than 50% of nutrition p.o. and continues with nonoral feedings; and/or safe with mod cues; and/or max diet restriction  Level 4 (CK): Safe swallow but needs mod cues; and/or mod diet restriction; and/or still requires some nonoral feeding/supplements  Level 5 (CJ): Safe swallow with min diet restriction; and/or needs min cues  Level 6 (CI): Independent with p.o.; rare cues; usually self cues; may need to avoid some foods or needs extra time  Level 7 ([address] [place] [address]): Independent for all p.o.  CRISTINO. (2003).  National Outcomes Measurement System (NOMS): Adult Speech-Language Pathology User's Guide. After treatment:   Patient left in no apparent distress in bed, Call bell within reach and Nursing notified    COMMUNICATION/EDUCATION:   Patient was educated regarding role of SLP, diet and POC. Patient nodded. The patient's plan of care including recommendations, planned interventions, and recommended diet changes were discussed with: Registered nurse.      Thank you for this referral.  MIRANDA Bal  Time Calculation: 15 mins

## 2021-08-16 NOTE — PROGRESS NOTES
Neurology Progress Note  Nallely Yanez NP      Date of admission: 2021    Patient: Martha Saleh MRN: [id number]  SSN: [id number]    YOB: 1945  Age: 77 y.o. Sex: male        Subjective:     HPI: Martha Saleh is a 77 y.o. male with past medical history of diabetes, hypertension, congestive heart failure, who was feeling fine when he went to bed last night and when he woke up this morning, he noticed that the right side of the face was feeling numb and it was droopy. It felt as if it was swollen and he was drooling. He presented to the emergency department and was admitted for further workup. He also reports right lower extremity weakness and some pain, which has been present for many years and is not new. At baseline, he uses a cane or a walker as needed. Denies any associated headache, changes in vision, speech, chest pain, shortness of breath, palpitations, nausea, or vomiting. Interval 21:     No events overnight. He denies HA, dizziness, weakness, vision changes, chest pain,SOB       Review of systems  Review of systems negative except as detailed in the HPI, interval, PMH and A&P    Past Medical History:   Diagnosis Date    Arthritis     CAD (coronary artery disease)     CABG    Diabetes ([address]     Heart failure ([address]     Hypertension     Other ill-defined conditions(799.89)     kidney stones      Past Surgical History:   Procedure Laterality Date    CARDIAC SURG PROCEDURE UNLIST      endarterectomy, CABG    HX CORONARY ARTERY BYPASS GRAFT      HX ORTHOPAEDIC      right knee surgery       Family History   Problem Relation Age of Onset    Diabetes Mother     Heart Disease Father     Cancer Sister     Kidney Disease Brother      Social History     Tobacco Use    Smoking status: Former Smoker     Quit date: 1986     Years since quittin.6    Smokeless tobacco: Former User     Quit date: 1989   Substance Use Topics    Alcohol use:  No Alcohol/week: 0.0 standard drinks      Prior to Admission medications    Medication Sig Start Date End Date Taking? Authorizing Provider   bumetanide (BUMEX) 2 mg tablet Take 2 mg by mouth two (2) times a day. Yes Traci, MD Osei   lisinopriL (PRINIVIL, ZESTRIL) 5 mg tablet Take 5 mg by mouth daily. Yes Traci, MD Osei   magnesium oxide (MAG-OX) 400 mg tablet Take 400 mg by mouth two (2) times a day. Yes Other, MD Osei   metFORMIN (GLUCOPHAGE) 500 mg tablet Take 500 mg by mouth two (2) times daily (with meals). Yes Other, MD Osei   aspirin delayed-release 81 mg tablet Take  by mouth daily. Yes Provider, Historical   allopurinoL (ZYLOPRIM) 100 mg tablet Take 100 mg by mouth daily. Yes Provider, Historical   potassium chloride SR (KLOR-CON 10) 10 mEq tablet Take 10 mEq by mouth. Yes Provider, Historical   pantoprazole (PROTONIX) 40 mg tablet Take 40 mg by mouth daily. Yes Provider, Historical   atorvastatin (LIPITOR) 40 mg tablet Take 80 mg by mouth daily. Yes Other, MD Osei   carvedilol (COREG) 6.25 mg tablet Take 1 Tab by mouth two (2) times daily (with meals). 4/6/12  Yes Chelsie Camejo MD   insulin NPH (NovoLIN N NPH U-100 Insulin) 100 unit/mL injection 40 Units by SubCUTAneous route once.     Traci, MD Osei   nitroglycerin (NITROSTAT) 0.4 mg SL tablet 1 Tab by SubLINGual route every five (5) minutes as needed for Chest Pain. 7/22/11   Marjorie Andrea MD     Current Facility-Administered Medications   Medication Dose Route Frequency Provider Last Rate Last Admin    allopurinoL (ZYLOPRIM) tablet 100 mg  100 mg Oral DAILY Ivone Hassan MD   100 mg at 08/16/21 0908    atorvastatin (LIPITOR) tablet 80 mg  80 mg Oral DAILY Romayne Schneider, MD   80 mg at 08/16/21 0910    [Held by provider] bumetanide (BUMEX) tablet 2 mg  2 mg Oral BID Romayne Schneider, MD        carvediloL (COREG) tablet 6.25 mg  6.25 mg Oral BID WITH MEALS Romayne Schneider, MD   6.25 mg at 08/16/21 0908    [Held by provider] lisinopriL ([facility] ZE[facility]) tablet 5 mg  5 mg Oral DAILY Katie Rascon MD        pantoprazole (PROTONIX) tablet 40 mg  40 mg Oral DAILY Katie Rascon MD   40 mg at 21 9988    acetaminophen (TYLENOL) tablet 650 mg  650 mg Oral Q4H PRN Katie Rascon MD        Or   [facility] acetaminophen (TYLENOL) solution 650 mg  650 mg Per NG tube Q4H PRN Katie Rascon MD        Or   [facility] acetaminophen (TYLENOL) suppository 650 mg  650 mg Rectal Q4H PRN Katie Rascon MD        aspirin tablet 325 mg  325 mg Oral DAILY Katie Rascon MD   325 mg at 21 0910    labetaloL (NORMODYNE;TRANDATE) injection 5 mg  5 mg IntraVENous Q10MIN PRN Katie Rascon MD        enoxaparin (LOVENOX) injection 40 mg  40 mg SubCUTAneous Q24H Ivone Hassan MD   40 mg at 21 7158    glucose chewable tablet 16 g  4 Tablet Oral PRN Katie Rascon MD        dextrose (D50W) injection syrg 12.5-25 g  25-50 mL IntraVENous PRN Katie Rascon MD        glucagon [facility]) injection 1 mg  1 mg IntraMUSCular PRN Katie Rascon MD        insulin NPH ([employer] N, HUMULIN N) injection 20 Units  20 Units SubCUTAneous ACB&D Katie Rascon MD   20 Units at 08/15/21 1430    insulin lispro (HUMALOG) injection   SubCUTAneous AC&HS Katie Rascon MD        clopidogreL (PLAVIX) tablet 75 mg  75 mg Oral DAILY Ana Frias MD   75 mg at 21 0908        No Known Allergies    Objective:     Vitals:    08/15/21 2200 21 0200 21 0602 21 1000   BP: 130/66 123/66 (!) 124/58 113/68   Pulse: 73 68 (!) 59 69   Resp: 19 17 18 17   Temp: 98.1 °F (36.7 °C) 98.3 °F (36.8 °C) 98 °F (36.7 °C) 97.6 °F (36.4 °C)   SpO2: 93% 98% 98% 100%        Temp (24hrs), Av °F (36.7 °C), Min:97.6 °F (36.4 °C), Max:98.3 °F (36.8 °C)        O2 Device: None (Room air)         Intake/Output Summary (Last 24 hours) at 2021 1026  Last data filed at 2021 0400  Gross per 24 hour   Intake --   Output 890 ml   Net -890 ml       General: In NAD. Cardiac: RRR  Lungs: Unlabored breathing. Extremities. No edema.   Neurologic Exam:  Mental Status:  Alert and oriented x 4. Language:    Grossly intact fluency and comprehension. No dysarthria. Cranial Nerves:   Pupils equal, round and reactive to light. Visual fields intact. Extraocular movements intact w/o nystagmus      Facial sensation intact to LT     Facial activation symmetric. Hearing grossly intact. Motor:    Bulk and tone normal.      5/5 strength in all extremities  Except on the right which is chonic     No pronator drift. No involuntary movements. Sensation:    Sensation intact throughout to light touch. Coordination & Gait: No ataxia with finger to nose. Gait deferred    LABS:  Recent Labs     08/15/21  0420 08/14/21  1418   WBC 3.4* 4.0*   HGB 10.7* 11.1*   HCT 33.5* 34.3*    195     Recent Labs     08/16/21  0409 08/15/21  0420 08/14/21  1418   * 137 131*   K 4.2 4.2 4.8    106 102   CO2 27 28 26   BUN 41* 45* 54*   CREA 1.77* 2.00* 2.80*   GLU 81 92 111*   CA 8.8 8.4* 8.8   MG  --  1.5*  --    PHOS  --  2.9  --      Recent Labs     08/15/21  0420 08/14/21  1418   ALT 11* 14   AP 92 113   TBILI 0.7 0.9   TP 6.1* 7.2   ALB 3.0* 3.6   GLOB 3.1 3.6     Recent Labs     08/14/21  1418   INR 1.1   PTP 11.6*        No results for input(s): PHI, PCO2I, PO2I, HCO3I in the last 72 hours. No results for input(s): CPK, CKNDX, TROIQ in the last 72 hours.     No lab exists for component: CPKMB  Lab Results   Component Value Date/Time    Cholesterol, total 119 08/15/2021 04:20 AM    HDL Cholesterol 26 08/15/2021 04:20 AM    LDL, calculated 65.4 08/15/2021 04:20 AM    Triglyceride 138 08/15/2021 04:20 AM    CHOL/HDL Ratio 4.6 08/15/2021 04:20 AM     Lab Results   Component Value Date/Time    Glucose (POC) 122 (H) 08/16/2021 09:08 AM    Glucose (POC) 66 08/16/2021 08:19 AM    Glucose (POC) 89 08/15/2021 09:27 PM    Glucose (POC) 98 08/15/2021 04:49 PM    Glucose (POC) 128 (H) 08/15/2021 11:35 AM     Lab Results   Component Value Date/Time    Color YELLOW/STRAW 08/01/2020 10:22 PM    Appearance CLEAR 08/01/2020 10:22 PM    Specific gravity 1.015 08/01/2020 10:22 PM    pH (UA) 5.0 08/01/2020 10:22 PM    Protein Negative 08/01/2020 10:22 PM    Glucose Negative 08/01/2020 10:22 PM    Ketone Negative 08/01/2020 10:22 PM    Bilirubin Negative 08/01/2020 10:22 PM    Urobilinogen 1.0 08/01/2020 10:22 PM    Nitrites Negative 08/01/2020 10:22 PM    Leukocyte Esterase Negative 08/01/2020 10:22 PM    Epithelial cells FEW 08/01/2020 10:22 PM    Bacteria Negative 08/01/2020 10:22 PM    WBC 0-4 08/01/2020 10:22 PM    RBC 0-5 08/01/2020 10:22 PM       No results for input(s): FE, TIBC, PSAT, FERR in the last 72 hours. No results found for: FOL, RBCF   No results for input(s): PH, PCO2, PO2 in the last 72 hours. No results for input(s): CPK, CKNDX, TROIQ in the last 72 hours. No lab exists for component: CPKMB    Imaging:  No results found. CT Results:  Results from Hospital Encounter encounter on 08/14/21    CT CODE NEURO PERF W CBF    Narrative  *PRELIMINARY REPORT*    Negative CT perfusion study. Proximal right vertebral artery proximal occlusion with reconstitution of a  small irregular vessel in the mid cervical region which is continuously patent  distally, with areas of severe stenosis in the V3 and proximal V4 segments. Patent, diminutive left vertebral artery with multiple areas of severe stenosis  in the cervical region as well as short segment occlusion in the proximal V4  segment. Small irregular basilar artery which is patent. Bilaterally patent  posterior communicating arteries and posterior cerebral arteries. .    No extracranial stenosis in the anterior circulation. Previous right carotid  endarterectomy. Moderate left and moderate to severe right paraclinoid distal  internal carotid stenosis stenosis. Patent anterior and middle cerebral arteries  and major branches.     Preliminary report was provided by Dr. Jessica Baltazar, the on-call radiologist, at  14:46 hours on 8/14/2021    Final report to follow. *END PRELIMINARY REPORT*    CLINICAL HISTORY: Stroke. Facial swelling and numbness on right side. EXAMINATION:  CT ANGIOGRAPHY HEAD AND NECK, CT PERFUSION    COMPARISON: None    TECHNIQUE:  Following the uneventful administration of iodinated contrast  material, axial CT angiography of the head and neck was performed. Delayed axial  images through the head were also obtained. Coronal and sagittal reconstructions  were obtained. Manual postprocessing of images was performed. 3-D  Sagittal  maximal intensity projection images were obtained. 3-D Coronal maximal  intensity projections were obtained. CT brain perfusion was performed with  generation of hemodynamic maps of multiple parameters, including cerebral blood  flow, cerebral blood volume, mean transit time, and TMAX. CT dose reduction was  achieved through use of a standardized protocol tailored for this examination  and automatic exposure control for dose modulation. This study was analyzed by  the [address] ai algorithm. FINDINGS:    Delayed contrast-enhanced head CT:    The ventricles are midline without hydrocephalus. There is no acute intra or  extra-axial hemorrhage. The basal cisterns are clear. The paranasal sinuses are  clear. CTA NECK:    Great vessels: Normal arch anatomy with the origins patent. Right subclavian artery: Patent    Left subclavian artery: Patent    Right common carotid artery: Patent    Left common carotid artery: Patent    Cervical right internal carotid artery: Postsurgical changes from endarterectomy  with no significant stenosis by NASCET criteria. Cervical left internal carotid artery: Calcified and noncalcified plaque with  less than 50% stenosis by NASCET criteria. Right vertebral artery: Occluded proximal right vertebral artery, with  reconstitution in the mid V2 segment likely by collaterals.  Distal vertebral  arteries heavily calcified and tortuous with moderate to severe stenosis. Left vertebral artery: Severe stenosis at the V1-V2 junction. Segmental moderate  to severe stenosis in the V2 segment. Focal occlusion in the distal V2 segment. Short segment occlusion at the V3/V4 junction. Lung apices are clear. Hypodense subcentimeter thyroid nodules bilaterally. No  cervical lymphadenopathy or soft tissue mass. CTA HEAD:    Right cavernous internal carotid artery: Calcified plaque with mild stenosis. Left cavernous internal carotid artery: Dense calcified plaque with at least  mild stenosis. Anterior cerebral arteries: Patent    Anterior communicating artery: Patent    Right middle cerebral artery: Patent    Left middle cerebral artery: Patent    Posterior communicating arteries: Patent    Posterior cerebral arteries: Patent    Basilar artery: Mild proximal basilar artery stenosis. CT Perfusion:  No perfusion defect. Impression  1. Bilateral vertebral artery atherosclerotic disease with occlusion of the  proximal right vertebral artery and severe stenosis/segmental occlusion of the  left vertebral artery. 2.  Patent posterior communicating artery supplying the posterior circulation  from the anterior circulation. 3.  No significant proximal internal carotid artery stenosis. 4.  Mild bilateral cavernous carotid artery stenosis. 5.  No intracranial large vessel occlusion. 6.  No perfusion defect. CTA CODE NEURO HEAD AND NECK W CONT    Narrative  *PRELIMINARY REPORT*    Negative CT perfusion study. Proximal right vertebral artery proximal occlusion with reconstitution of a  small irregular vessel in the mid cervical region which is continuously patent  distally, with areas of severe stenosis in the V3 and proximal V4 segments. Patent, diminutive left vertebral artery with multiple areas of severe stenosis  in the cervical region as well as short segment occlusion in the proximal V4  segment. Small irregular basilar artery which is patent.  Bilaterally patent  posterior communicating arteries and posterior cerebral arteries. .    No extracranial stenosis in the anterior circulation. Previous right carotid  endarterectomy. Moderate left and moderate to severe right paraclinoid distal  internal carotid stenosis stenosis. Patent anterior and middle cerebral arteries  and major branches. Preliminary report was provided by Dr. Dayana Lnagford, the on-call radiologist, at  14:46 hours on 8/14/2021    Final report to follow. *END PRELIMINARY REPORT*    CLINICAL HISTORY: Stroke. Facial swelling and numbness on right side. EXAMINATION:  CT ANGIOGRAPHY HEAD AND NECK, CT PERFUSION    COMPARISON: None    TECHNIQUE:  Following the uneventful administration of iodinated contrast  material, axial CT angiography of the head and neck was performed. Delayed axial  images through the head were also obtained. Coronal and sagittal reconstructions  were obtained. Manual postprocessing of images was performed. 3-D  Sagittal  maximal intensity projection images were obtained. 3-D Coronal maximal  intensity projections were obtained. CT brain perfusion was performed with  generation of hemodynamic maps of multiple parameters, including cerebral blood  flow, cerebral blood volume, mean transit time, and TMAX. CT dose reduction was  achieved through use of a standardized protocol tailored for this examination  and automatic exposure control for dose modulation. This study was analyzed by  the [address] ai algorithm. FINDINGS:    Delayed contrast-enhanced head CT:    The ventricles are midline without hydrocephalus. There is no acute intra or  extra-axial hemorrhage. The basal cisterns are clear. The paranasal sinuses are  clear. CTA NECK:    Great vessels: Normal arch anatomy with the origins patent.     Right subclavian artery: Patent    Left subclavian artery: Patent    Right common carotid artery: Patent    Left common carotid artery: Patent    Cervical right internal carotid artery: Postsurgical changes from endarterectomy  with no significant stenosis by NASCET criteria. Cervical left internal carotid artery: Calcified and noncalcified plaque with  less than 50% stenosis by NASCET criteria. Right vertebral artery: Occluded proximal right vertebral artery, with  reconstitution in the mid V2 segment likely by collaterals. Distal vertebral  arteries heavily calcified and tortuous with moderate to severe stenosis. Left vertebral artery: Severe stenosis at the V1-V2 junction. Segmental moderate  to severe stenosis in the V2 segment. Focal occlusion in the distal V2 segment. Short segment occlusion at the V3/V4 junction. Lung apices are clear. Hypodense subcentimeter thyroid nodules bilaterally. No  cervical lymphadenopathy or soft tissue mass. CTA HEAD:    Right cavernous internal carotid artery: Calcified plaque with mild stenosis. Left cavernous internal carotid artery: Dense calcified plaque with at least  mild stenosis. Anterior cerebral arteries: Patent    Anterior communicating artery: Patent    Right middle cerebral artery: Patent    Left middle cerebral artery: Patent    Posterior communicating arteries: Patent    Posterior cerebral arteries: Patent    Basilar artery: Mild proximal basilar artery stenosis. CT Perfusion:  No perfusion defect. Impression  1. Bilateral vertebral artery atherosclerotic disease with occlusion of the  proximal right vertebral artery and severe stenosis/segmental occlusion of the  left vertebral artery. 2.  Patent posterior communicating artery supplying the posterior circulation  from the anterior circulation. 3.  No significant proximal internal carotid artery stenosis. 4.  Mild bilateral cavernous carotid artery stenosis. 5.  No intracranial large vessel occlusion. 6.  No perfusion defect.       CT CODE NEURO HEAD WO CONTRAST    Narrative  Indication:  stroke    Comparison: None    Findings: 5 mm axial images were obtained from the skull base through the  vertex. CT dose reduction was achieved through the use of a standardized protocol  tailored for this examination and automatic exposure control for dose  modulation. The ventricles and cortical sulci are prominent, compatible with age related  volume loss. There is no evidence of intracranial hemorrhage, mass, mass effect,  or acute infarct. There is periventricular white matter disease. No extra-axial  fluid collections are seen. The visualized paranasal sinuses and mastoid air  cells are clear. The orbital structures are unremarkable. No osseous  abnormalities are seen. Impression  1. No evidence of acute infarct or intracranial hemorrhage. 2. Mild periventricular white matter disease is likely secondary to chronic  small vessel ischemic changes. The findings were called to Dr. Brittany Huynh. on 8/14/2021 at 2:08 PM by Dr. Katie Mayo. [phone]      MRI Results:  No results found for this or any previous visit. XR Results   Results from [place] encounter on 01/26/20    XR CHEST PORT    Impression  IMPRESSION:  No acute cardiopulmonary disease radiographically. .  . Stable cardiomegaly. Results from [place] encounter on 12/16/12    XR CHEST PA LAT      XR HAND LT MIN 3 V      VAS/US Results (maximum last 3): No results found for this or any previous visit. TTE  08/14/21    ECHO ADULT COMPLETE 08/15/2021 8/15/2021    Interpretation Summary  · Image quality for this study was technically difficult. · Contrast used: DEFINITY. · LV: Estimated LVEF is 55 - 60%. Normal cavity size and systolic function (ejection fraction normal). Upper normal wall thickness. Mild (grade 1) left ventricular diastolic dysfunction. · LA: Mildly dilated left atrium. · MV: Mild mitral valve regurgitation is present. · RV: Mildly reduced systolic function. · No bubble study performed.     Signed by: Patrica Maciel MD on 8/15/2021  5:25 PM        EKG  Results for orders placed or performed during the hospital encounter of 08/01/20   EKG, 12 LEAD, INITIAL   Result Value Ref Range    Ventricular Rate 76 BPM    Atrial Rate 76 BPM    P-R Interval 172 ms    QRS Duration 102 ms    Q-T Interval 394 ms    QTC Calculation (Bezet) 443 ms    Calculated P Axis 39 degrees    Calculated R Axis 54 degrees    Calculated T Axis 33 degrees    Diagnosis       Sinus rhythm with premature atrial complexes  Nonspecific ST and T wave abnormality  When compared with ECG of 16-DEC-2012 03:45,  premature atrial complexes are now present    Confirmed by Kylee Donahue M.D., [facility] ([id number]) on 8/3/2020 6:21:57 PM         Hospital Problems  Date Reviewed: 8/3/2020        Codes Class Noted POA    DM (diabetes mellitus), type 2, uncontrolled ([facility] [address] (Chronic) ICD-10-CM: E11.65  ICD-9-CM: 250.02  2/17/2011 Yes        CAD (coronary artery disease) of artery bypass graft (Chronic) ICD-10-CM: I25.810  ICD-9-CM: 414.05  2/17/2011 Yes        HTN (hypertension) (Chronic) ICD-10-CM: I10  ICD-9-CM: 401.9  2/17/2011 Yes        Weakness on right side of face ICD-10-CM: R29.810  ICD-9-CM: 781.94  8/15/2021 Yes        * (Principal) CVA (cerebral vascular accident) [facility]) ICD-10-CM: I63.9  ICD-9-CM: 434.91  8/14/2021 Yes        Mitral regurgitation ICD-10-CM: I34.0  ICD-9-CM: 424.0  4/5/2012 Yes        Aortic stenosis ICD-10-CM: I35.0  ICD-9-CM: 424.1  4/5/2012 Yes        S/P CABG (coronary artery bypass graft) ICD-10-CM: Z95.1  ICD-9-CM: V45.81  4/5/2012 Yes              Assessment/Plan:     Juve Hernandez is a 77 y.o. male with hypertension, diabetes, congestive heart failure, who has significant intracranial atherosclerotic vascular disease, presenting with right-sided facial weakness and numbness. MRI of the brain, pending. One exam he seems to be back to his baseline. Plan    -MRI of the Brain   -Echo 55-60%Mild (grade 1) left ventricular diastolic dysfunction. LA: Mildly dilated left atrium.    -P2Y12 282 which is a decrease from Continue 329;Continue Plavix 75 mg for 3 weeks then continue Monotherapy ASA 325mg daily thereafter    -Asprin Test 401, Continue  mg daily    -PT/OT/SLP   -Stroke educations        Thank you for this consult. Addendum   MRI of the brain revealed No acute infarct. Chronic bilateral cerebellar lacunar infarcts. Chronic cerebral white matterlacunar infarcts. Mild to moderate cerebral white matter signal abnormality consistent with chronic small vessel ischemic change.       Okay for discharge         Signed By: Jose Henriquez NP     August 16, 2021 10:26 AM

## 2021-08-16 NOTE — PROGRESS NOTES
Problem: Self Care Deficits Care Plan (Adult)  Goal: *Acute Goals and Plan of Care (Insert Text)  Description:   FUNCTIONAL STATUS PRIOR TO ADMISSION: Patient was modified independent using a rolling walker for functional mobility. HOME SUPPORT: The patient lived with spouse and daughter. Occupational Therapy Goals  Initiated 8/15/2021  1. Patient will perform toilet transfers with modified independence within 7 day(s). 2.  Patient will perform all aspects of toileting with modified independence within 7 day(s). 3.  Patient will utilize energy conservation techniques during functional activities with verbal cues within 7 day(s). Outcome: Progressing Towards Goal     OCCUPATIONAL THERAPY TREATMENT  Patient: Kennedy Funes (89 y.o. male)  Date: 8/16/2021  Diagnosis: CVA (cerebral vascular accident) [facility]) [I63.9] CVA (cerebral vascular accident) ([facility] [address]       Precautions:  fall  Chart, occupational therapy assessment, plan of care, and goals were reviewed. ASSESSMENT  Patient continues with skilled OT services and is progressing towards goals. Patient remains limited by RLE weakness (which he reports is improving) and impaired standing balance. Performed the following with up to contact guard assistance: donned/ tied shoes seated reaching to feet on floor, sit-stand, ambulated/ from bathroom using RW, toilet transfer, and standing grooming. Patient is nearing functional baseline and reports he has good family support at home. Patient was receptive to education on fall prevention as well as BEFAST signs/ symptoms of CVA. He would benefit from [facility] to promote safety and independence in home environment. Current Level of Function Impacting Discharge (ADLs): up to contact guard assistance         PLAN :  Patient continues to benefit from skilled intervention to address the above impairments. Continue treatment per established plan of care to address goals.     Recommendation for discharge: (in order for the patient to meet his/her long term goals)  Occupational therapy at least 2 days/week in the home     This discharge recommendation:  Has been made in collaboration with the attending provider and/or case management         SUBJECTIVE:   Patient stated I'm feeling better.     OBJECTIVE DATA SUMMARY:   Cognitive/Behavioral Status:  Neurologic State: Alert  Orientation Level: Oriented to person;Oriented to place;Oriented to situation;Oriented to time  Cognition: Follows commands  Perception: Appears intact  Perseveration: No perseveration noted  Safety/Judgement: Awareness of environment    Functional Mobility and Transfers for ADLs:  Bed Mobility:  Supine to Sit: Supervision    Transfers:  Sit to Stand: Supervision  Functional Transfers  Toilet Transfer : Contact guard assistance (using RW and grab bar)       Balance:  Sitting: Intact  Standing: Impaired; With support (RW)  Standing - Static: Good  Standing - Dynamic : Good    ADL Intervention:       Grooming  Washing Hands: Contact guard assistance (standing at sink)    Lower Body Dressing Assistance  Shoes with Cloth Laces: Set-up (donned/ tied seated, reaching to feet on floor)    Cognitive Retraining  Safety/Judgement: Awareness of environment    Pain:  Patient did not report pain    Activity Tolerance:   VSS, good    After treatment patient left in no apparent distress:   Sitting in chair, Call bell within reach, and Bed / chair alarm activated    COMMUNICATION/COLLABORATION:   The patients plan of care was discussed with: Physical therapist and Registered nurse. Patient and/or family was verbally educated on the BE FAST acronym for signs/symptoms of CVA and TIA. Provided with BEFAST handout. All questions answered with patient indicating good understanding.      Scott Mccray OT  Time Calculation: 27 mins

## 2021-08-16 NOTE — PROGRESS NOTES
[address] [facility] Adult  Hospitalist Group                                                                                          Hospitalist Progress Note  Rosette Cade   Lisa service: [phone] OR [phone] from in house phone        Date of Service:  2021  NAME:  Carlos Dominguez  :  1945  MRN:  [id number]              Pt Name  Carlos Dominguez   Date of Birth 1945   Medical Record Number  [id number]       Age  77 y.o. PCP Mathew Garvin MD   Admit date:  2021    Room Number  656/01  @ [facility]   Date of Service  2021      Admission Diagnoses:  <principal problem not specified>      History of present illness (copied from H&P)  \" Davin Turner a 77 y. o. male who presents with right facial droop, numbness     Patient is a 41-year-old male h/o DM, HTN, CHF who presents today right facial droop and weakness. RIGHT sided facial droop and numbness starting approx midnight. Pt c/o \"facial swelling\" that has gotten worse since midnight. Pt states also has numbness from hip down to feet. Patient stated he has chronic right legg weakness with numbness from previous procedure couple years back but now worse. pt said he is taking baby aspirin every day. Not on other  Anticoagulation.   The patient denies any fever, chills, chest pain, cough, congestion, recent illness, palpitations, or dysuria. \"      Assessment and plan:   Right facial numbness/weakness - No acute stroke per MRI this afternoon. · Appreciate guidance from Dr. Jessica Forte neurologist   Neurology recommended to continue Plavix 75 mg for 3 weeks then continue Monotherapy ASA 325mg daily thereafter. Will revisit antiplatelet therapy with neurology now that MRI has returned negative for acute stroke. Continue statin.      JULIO CESAR on CKD Stage IIIa -Improving creatinine. Baseline creatinine appears to be about 1.45.  Patient presented with creatinine of 2.8, now down to 1.77.  · Continue to monitor with daily BMP      Gait abnormality. Physical therapy. CAD. S/p CABG  · Continue aspirin, Lipitor, coreg.     Hyperlipidemia -on high dose Statin Rx   · Continue Lipitor as is      Body mass index is 35.1 kg/m². -            CODE STATUS   Full   Functional Status  Pt resides in [address] [facility] with his wife and daughter     Surrogate decision maker:  Pt's wife      Prophylaxis   Lovenox   Discharge Plan:   PT, OT, RN,      Misc    Benefit:  Payor: Asaf Henry / Plan: [address] [place] [address] HMO / Product Type: Managed Care Medicare /    Isolation :  There are currently no Active Isolations   ADT status:  INPATIENT      Query   None noted today    Prognosis       Social issues  Date  Comment     8/15/21  12:25 PM  No family or visitor here with the patient today       8/16/2021  11:17 AM No family or visitor here with the patient today                   Interval history / Subjective:     Patient states that he came to the hospital because he developed numbness and swelling on the right side of his face. He describes right perioral numbness. Patient states that he has had numbness chronically from the right side to the right foot starting in late, 2020. He states that he walks with a cane and a walker. Patient states that he fell once this month, hit his shoulder, did not hit his head or have any loss of consciousness. He states that his right leg gave out, \"jumped\" and he could not hold his balance. Patient states that he has had problems picking up things like coins with right hand for years. However, he is able to hold a cup or a phone with his right hand usually without problems. Patient is oriented to person, place, year but not to month. He does appear to be a limited historian. Review of Systems:   A comprehensive review of systems was negative except for that written in the HPI. Vital Signs:    Last 24hrs VS reviewed since prior progress note.  Most recent are:  Visit Vitals  /68 (BP 1 Location: Left upper arm, BP Patient Position: At rest)   Pulse 69   Temp 97.6 °F (36.4 °C)   Resp 17   Ht 5' 8\" (1.727 m)   Wt 104 kg (229 lb 4.5 oz)   SpO2 100% on room air   BMI 34.86 kg/m²         Intake/Output Summary (Last 24 hours) at 8/16/2021 1117  Last data filed at 8/16/2021 0400  Gross per 24 hour   Intake --   Output 890 ml   Net -890 ml        Physical Examination:     I had a face to face encounter with this patient and independently examined them on 8/16/2021 as outlined below:          Constitutional:  No acute distress, cooperative, pleasant    ENT:  Oral mucosa moist, oropharynx benign. Resp:  CTA bilaterally. No wheezing/rhonchi/rales. No accessory muscle use   CV:  Regular rhythm, normal rate, no murmurs, gallops, rubs    GI:  Soft, non distended, non tender. normoactive bowel sounds, no hepatosplenomegaly     Musculoskeletal:  No edema, warm, 2+ pulses throughout    Neurologic:  Right leg strength is 4/5, RUE strength is 5/5; LLE  Strength in 5/5, LUE strength 5/5. Patient is oriented to person, place, year but not to month. Data Review:    Review and/or order of clinical lab test      MRI BRAIN WO CONT  Narrative: EXAM: MRI BRAIN WO CONT    TECHNIQUE: Brain images including sagittal and axial T1-weighted, axial FLAIR,  T2-weighted, diffusion weighted, gradient echo,  susceptibility weighted,  coronal T2    IV CONTRAST:  None    INDICATION:  cva    COMPARISON:  CT and CTA angiogram of 8/14/2021    FINDINGS:  BRAIN PARENCHYMA:  No acute infarct. Mild confluent and scattered foci of  FLAIR/T2 hyperintensity in the cerebral white matter. Scattered lacunar infarcts  in the deep cerebral white matter. Chronic bilateral cerebellar lacunar  infarcts. .  INTRACRANIAL HEMORRHAGE: None. CSF SPACES:  Normal in size and morphology for the patient's age. BASAL CISTERNS:  Patent. MIDLINE SHIFT: None. VASCULAR SYSTEM:  Normal flow voids.    PARANASAL SINUSES AND MASTOID AIR CELLS:  Mild right mastoid fluid. Paranasal  sinuses clear. VISUALIZED ORBITS:  No significant abnormalities. VISUALIZED UPPER CERVICAL SPINE:  No significant abnormalities. SELLA:  No enlargement or  focal abnormality. SKULL BASE:  No significant abnormalities. Cerebellar tonsils in normal  position. CALVARIUM:  Intact. Impression: 1. No acute infarct. 2. Chronic bilateral cerebellar lacunar infarcts. Chronic cerebral white matter  lacunar infarcts. Mild to moderate cerebral white matter signal abnormality  consistent with chronic small vessel ischemic change. Labs:     Recent Labs     08/15/21  0420 08/14/21  1418   WBC 3.4* 4.0*   HGB 10.7* 11.1*   HCT 33.5* 34.3*    195     Recent Labs     08/16/21  0409 08/15/21  0420 08/14/21  1418   * 137 131*   K 4.2 4.2 4.8    106 102   CO2 27 28 26   BUN 41* 45* 54*   CREA 1.77* 2.00* 2.80*   GLU 81 92 111*   CA 8.8 8.4* 8.8   MG  --  1.5*  --    PHOS  --  2.9  --      Recent Labs     08/15/21  0420 08/14/21  1418   ALT 11* 14   AP 92 113   TBILI 0.7 0.9   TP 6.1* 7.2   ALB 3.0* 3.6   GLOB 3.1 3.6     Recent Labs     08/14/21  1418   INR 1.1   PTP 11.6*      No results for input(s): FE, TIBC, PSAT, FERR in the last 72 hours. No results found for: FOL, RBCF   No results for input(s): PH, PCO2, PO2 in the last 72 hours. No results for input(s): CPK, CKNDX, TROIQ in the last 72 hours.     No lab exists for component: CPKMB  Lab Results   Component Value Date/Time    Cholesterol, total 119 08/15/2021 04:20 AM    HDL Cholesterol 26 08/15/2021 04:20 AM    LDL, calculated 65.4 08/15/2021 04:20 AM    Triglyceride 138 08/15/2021 04:20 AM    CHOL/HDL Ratio 4.6 08/15/2021 04:20 AM     Lab Results   Component Value Date/Time    Glucose (POC) 122 (H) 08/16/2021 09:08 AM    Glucose (POC) 66 08/16/2021 08:19 AM    Glucose (POC) 89 08/15/2021 09:27 PM    Glucose (POC) 98 08/15/2021 04:49 PM    Glucose (POC) 128 (H) 08/15/2021 11:35 AM     Lab Results   Component Value Date/Time    Color YELLOW/STRAW 08/01/2020 10:22 PM    Appearance CLEAR 08/01/2020 10:22 PM    Specific gravity 1.015 08/01/2020 10:22 PM    pH (UA) 5.0 08/01/2020 10:22 PM    Protein Negative 08/01/2020 10:22 PM    Glucose Negative 08/01/2020 10:22 PM    Ketone Negative 08/01/2020 10:22 PM    Bilirubin Negative 08/01/2020 10:22 PM    Urobilinogen 1.0 08/01/2020 10:22 PM    Nitrites Negative 08/01/2020 10:22 PM    Leukocyte Esterase Negative 08/01/2020 10:22 PM    Epithelial cells FEW 08/01/2020 10:22 PM    Bacteria Negative 08/01/2020 10:22 PM    WBC 0-4 08/01/2020 10:22 PM    RBC 0-5 08/01/2020 10:22 PM         Medications Reviewed:     Current Facility-Administered Medications   Medication Dose Route Frequency    allopurinoL (ZYLOPRIM) tablet 100 mg  100 mg Oral DAILY    atorvastatin (LIPITOR) tablet 80 mg  80 mg Oral DAILY    [Held by provider] bumetanide (BUMEX) tablet 2 mg  2 mg Oral BID    carvediloL (COREG) tablet 6.25 mg  6.25 mg Oral BID WITH MEALS    [Held by provider] lisinopriL (PRINIVIL, ZESTRIL) tablet 5 mg  5 mg Oral DAILY    pantoprazole (PROTONIX) tablet 40 mg  40 mg Oral DAILY    acetaminophen (TYLENOL) tablet 650 mg  650 mg Oral Q4H PRN    Or    acetaminophen (TYLENOL) solution 650 mg  650 mg Per NG tube Q4H PRN    Or    acetaminophen (TYLENOL) suppository 650 mg  650 mg Rectal Q4H PRN    aspirin tablet 325 mg  325 mg Oral DAILY    labetaloL (NORMODYNE;TRANDATE) injection 5 mg  5 mg IntraVENous Q10MIN PRN    enoxaparin (LOVENOX) injection 40 mg  40 mg SubCUTAneous Q24H    glucose chewable tablet 16 g  4 Tablet Oral PRN    dextrose (D50W) injection syrg 12.5-25 g  25-50 mL IntraVENous PRN    glucagon (GLUCAGEN) injection 1 mg  1 mg IntraMUSCular PRN    insulin NPH (NOVOLIN N, HUMULIN N) injection 20 Units  20 Units SubCUTAneous ACB&D    insulin lispro (HUMALOG) injection   SubCUTAneous AC&HS    clopidogreL (PLAVIX) tablet 75 mg  75 mg Oral DAILY ______________________________________________________________________  EXPECTED LENGTH OF STAY: - - -  ACTUAL LENGTH OF STAY:          27 Lolita Shields DO

## 2021-08-16 NOTE — PROGRESS NOTES
Problem: Mobility Impaired (Adult and Pediatric)  Goal: *Acute Goals and Plan of Care (Insert Text)  Description:   FUNCTIONAL STATUS PRIOR TO ADMISSION: Patient was modified independent using a rolling walker for functional mobility. HOME SUPPORT PRIOR TO ADMISSION: The patient lived with wife and daughter. Physical Therapy Goals  Initiated 8/15/2021  1. Patient will move from supine to sit and sit to supine  in bed with modified independence within 7 day(s). 2.  Patient will transfer from bed to chair and chair to bed with modified independence using the least restrictive device within 7 day(s). 3.  Patient will perform sit to stand with modified independence within 7 day(s). 4.  Patient will ambulate with supervision/set-up for 250 feet with the least restrictive device within 7 day(s). 5.  Patient will ascend/descend 4 stairs with 2 handrail(s) with supervision/set-up within 7 day(s). 6.  Patient will improve Verduzco Balance score by 7 points (40/56) within 7 days. Outcome: Progressing Towards Goal     PHYSICAL THERAPY TREATMENT  Patient: Jonny More (89 y.o. male)  Date: 8/16/2021  Diagnosis: CVA (cerebral vascular accident) [facility]) [I63.9] CVA (cerebral vascular accident) ([facility] [address]       Precautions:    Chart, physical therapy assessment, plan of care and goals were reviewed. ASSESSMENT  Patient continues with skilled PT services and is progressing towards goals - remains most limited by new increased R LE weakness and foot drop, impaired balance, impaired gait, and decreased tolerance to activity leading to increased falls risk from baseline level. Received pt supine in bed, incontinent of large amounts of urine. Completed bed mobility and sit<>stands from various surfaces with SUP for safety. Gait training completed to/from bathroom with RW - demos good/fair standing balance while assisted with pericare and gown change.  Gait training further progressed in hallway with RW - cues for exaggerated R hip flexion through swing to compensate for R foot drop. No overt LOB or knee buckle - quick to fatigue and note increased UE reliance on RW but steady. May benefit from trial/fitting for AFO on OP basis. Remained up in recliner at end of session, NAD. Anticipate that he will be appropriate for discharge home with family assist and HHPT. Will follow. Current Level of Function Impacting Discharge (mobility/balance): CGA/SUP with Rw    Other factors to consider for discharge: none         PLAN :  Patient continues to benefit from skilled intervention to address the above impairments. Continue treatment per established plan of care. to address goals. Recommendation for discharge: (in order for the patient to meet his/her long term goals)  Physical therapy at least 2 days/week in the home     This discharge recommendation:  Has been made in collaboration with the attending provider and/or case management    IF patient discharges home will need the following DME: patient owns DME required for discharge       SUBJECTIVE:   Patient stated This is how I do at home.     OBJECTIVE DATA SUMMARY:   Critical Behavior:  Neurologic State: Alert  Orientation Level: Oriented to person, Oriented to place, Oriented to situation, Oriented to time  Cognition: Follows commands  Safety/Judgement: Awareness of environment  Functional Mobility Training:  Bed Mobility:  Supine to Sit: Supervision     Transfers:  Sit to Stand: Supervision  Stand to Sit: Supervision     Balance:  Sitting: Intact  Standing - Static: Constant support;Good  Standing - Dynamic : Constant support;Good (with RW)    Ambulation/Gait Training:  Distance (ft): 150 Feet (ft)  Assistive Device: Gait belt;Walker, rolling  Ambulation - Level of Assistance: Contact guard assistance  Gait Abnormalities: Foot drop; Shuffling gait  Speed/Kamille: Slow;Shuffled   Swing Pattern: Right asymmetrical    Activity Tolerance:   Good    After treatment patient left in no apparent distress:   Sitting in chair, Call bell within reach, and Bed / chair alarm activated    COMMUNICATION/COLLABORATION:   The patients plan of care was discussed with: Registered nurse and Case management.       Kana Arzate, PT, DPT   Time Calculation: 29 mins

## 2021-08-16 NOTE — PROGRESS NOTES
Transition of Care Plan   RUR- Low  16%    DISPOSITION: Home with home health   F/U with PCP/Specialist     Transport: Family    CM spoke with patient at bedside regarding home health and discharge planning. Patient stated he was already open with [facility] provider but unsure of agency name. CM left message with patient's PCP office to inquire about agency. CM to send JUNG order when information is available. Elizabeth Blind) JESSICA MobleyS.W.

## 2021-08-16 NOTE — PROGRESS NOTES
Problem: Diabetes Self-Management  Goal: *Disease process and treatment process  Description: Define diabetes and identify own type of diabetes; list 3 options for treating diabetes. Outcome: Progressing Towards Goal  Goal: *Incorporating nutritional management into lifestyle  Description: Describe effect of type, amount and timing of food on blood glucose; list 3 methods for planning meals. Outcome: Progressing Towards Goal  Goal: *Incorporating physical activity into lifestyle  Description: State effect of exercise on blood glucose levels. Outcome: Progressing Towards Goal  Goal: *Developing strategies to promote health/change behavior  Description: Define the ABC's of diabetes; identify appropriate screenings, schedule and personal plan for screenings. Outcome: Progressing Towards Goal  Goal: *Using medications safely  Description: State effect of diabetes medications on diabetes; name diabetes medication taking, action and side effects. Outcome: Progressing Towards Goal  Goal: *Monitoring blood glucose, interpreting and using results  Description: Identify recommended blood glucose targets  and personal targets. Outcome: Progressing Towards Goal  Goal: *Prevention, detection, treatment of acute complications  Description: List symptoms of hyper- and hypoglycemia; describe how to treat low blood sugar and actions for lowering  high blood glucose level. Outcome: Progressing Towards Goal  Goal: *Prevention, detection and treatment of chronic complications  Description: Define the natural course of diabetes and describe the relationship of blood glucose levels to long term complications of diabetes.   Outcome: Progressing Towards Goal  Goal: *Developing strategies to address psychosocial issues  Description: Describe feelings about living with diabetes; identify support needed and support network  Outcome: Progressing Towards Goal  Goal: *Insulin pump training  Outcome: Progressing Towards Goal  Goal: *Sick day guidelines  Outcome: Progressing Towards Goal  Goal: *Patient Specific Goal (EDIT GOAL, INSERT TEXT)  Outcome: Progressing Towards Goal     Problem: Patient Education: Go to Patient Education Activity  Goal: Patient/Family Education  Outcome: Progressing Towards Goal     Problem: Falls - Risk of  Goal: *Absence of Falls  Description: Document [place] Fall Risk and appropriate interventions in the flowsheet.   Outcome: Progressing Towards Goal  Note: Fall Risk Interventions:  Mobility Interventions: Communicate number of staff needed for ambulation/transfer, OT consult for ADLs, Bed/chair exit alarm         Medication Interventions: Patient to call before getting OOB, Teach patient to arise slowly         History of Falls Interventions: Consult care management for discharge planning, Door open when patient unattended, Evaluate medications/consider consulting pharmacy, Bed/chair exit alarm         Problem: Patient Education: Go to Patient Education Activity  Goal: Patient/Family Education  Outcome: Progressing Towards Goal     Problem: Patient Education: Go to Patient Education Activity  Goal: Patient/Family Education  Outcome: Progressing Towards Goal     Problem: TIA/CVA Stroke: Day 2 Until Discharge  Goal: Off Pathway (Use only if patient is Off Pathway)  Outcome: Progressing Towards Goal  Goal: Activity/Safety  Outcome: Progressing Towards Goal  Goal: Diagnostic Test/Procedures  Outcome: Progressing Towards Goal  Goal: Nutrition/Diet  Outcome: Progressing Towards Goal  Goal: Discharge Planning  Outcome: Progressing Towards Goal  Goal: Medications  Outcome: Progressing Towards Goal  Goal: Respiratory  Outcome: Progressing Towards Goal  Goal: Treatments/Interventions/Procedures  Outcome: Progressing Towards Goal  Goal: Psychosocial  Outcome: Progressing Towards Goal  Goal: *Verbalizes anxiety and depression are reduced or absent  Outcome: Progressing Towards Goal  Goal: *Absence of aspiration  Outcome: Progressing Towards Goal  Goal: *Absence of deep venous thrombosis signs and symptoms(Stroke Metric)  Outcome: Progressing Towards Goal  Goal: *Optimal pain control at patient's stated goal  Outcome: Progressing Towards Goal  Goal: *Tolerating diet  Outcome: Progressing Towards Goal  Goal: *Ability to perform ADLs and demonstrates progressive mobility and function  Outcome: Progressing Towards Goal  Goal: *Stroke education continued(Stroke Metric)  Outcome: Progressing Towards Goal     Problem: Ischemic Stroke: Discharge Outcomes  Goal: *Verbalizes anxiety and depression are reduced or absent  Outcome: Progressing Towards Goal  Goal: *Verbalize understanding of risk factor modification(Stroke Metric)  Outcome: Progressing Towards Goal  Goal: *Hemodynamically stable  Outcome: Progressing Towards Goal  Goal: *Absence of aspiration pneumonia  Outcome: Progressing Towards Goal  Goal: *Aware of needed dietary changes  Outcome: Progressing Towards Goal  Goal: *Verbalize understanding of prescribed medications including anti-coagulants, anti-lipid, and/or anti-platelets(Stroke Metric)  Outcome: Progressing Towards Goal  Goal: *Tolerating diet  Outcome: Progressing Towards Goal  Goal: *Aware of follow-up diagnostics related to anticoagulants  Outcome: Progressing Towards Goal  Goal: *Ability to perform ADLs and demonstrates progressive mobility and function  Outcome: Progressing Towards Goal  Goal: *Absence of DVT(Stroke Metric)  Outcome: Progressing Towards Goal  Goal: *Absence of aspiration  Outcome: Progressing Towards Goal  Goal: *Optimal pain control at patient's stated goal  Outcome: Progressing Towards Goal  Goal: *Home safety concerns addressed  Outcome: Progressing Towards Goal  Goal: *Describes available resources and support systems  Outcome: Progressing Towards Goal  Goal: *Verbalizes understanding of activation of EMS(911) for stroke symptoms(Stroke Metric)  Outcome: Progressing Towards Goal  Goal: *Understands and describes signs and symptoms to report to providers(Stroke Metric)  Outcome: Progressing Towards Goal  Goal: *Neurolgocially stable (absence of additional neurological deficits)  Outcome: Progressing Towards Goal  Goal: *Verbalizes importance of follow-up with primary care physician(Stroke Metric)  Outcome: Progressing Towards Goal  Goal: *Smoking cessation discussed,if applicable(Stroke Metric)  Outcome: Progressing Towards Goal  Goal: *Depression screening completed(Stroke Metric)  Outcome: Progressing Towards Goal

## 2021-08-17 VITALS
BODY MASS INDEX: 34.55 KG/M2 | DIASTOLIC BLOOD PRESSURE: 70 MMHG | WEIGHT: 227.96 LBS | TEMPERATURE: 97.8 F | SYSTOLIC BLOOD PRESSURE: 119 MMHG | OXYGEN SATURATION: 99 % | HEIGHT: 68 IN | RESPIRATION RATE: 23 BRPM | HEART RATE: 66 BPM

## 2021-08-17 LAB
ANION GAP SERPL CALC-SCNC: 5 MMOL/L (ref 5–15)
BUN SERPL-MCNC: 31 MG/DL (ref 6–20)
BUN/CREAT SERPL: 21 (ref 12–20)
CALCIUM SERPL-MCNC: 8.5 MG/DL (ref 8.5–10.1)
CHLORIDE SERPL-SCNC: 104 MMOL/L (ref 97–108)
CO2 SERPL-SCNC: 25 MMOL/L (ref 21–32)
CREAT SERPL-MCNC: 1.48 MG/DL (ref 0.7–1.3)
GLUCOSE BLD STRIP.AUTO-MCNC: 132 MG/DL (ref 65–117)
GLUCOSE BLD STRIP.AUTO-MCNC: 204 MG/DL (ref 65–117)
GLUCOSE SERPL-MCNC: 126 MG/DL (ref 65–100)
POTASSIUM SERPL-SCNC: 4.5 MMOL/L (ref 3.5–5.1)
SERVICE CMNT-IMP: ABNORMAL
SERVICE CMNT-IMP: ABNORMAL
SODIUM SERPL-SCNC: 134 MMOL/L (ref 136–145)

## 2021-08-17 PROCEDURE — 80048 BASIC METABOLIC PNL TOTAL CA: CPT

## 2021-08-17 PROCEDURE — 74011250637 HC RX REV CODE- 250/637: Performed by: HOSPITALIST

## 2021-08-17 PROCEDURE — 74011250637 HC RX REV CODE- 250/637: Performed by: PSYCHIATRY & NEUROLOGY

## 2021-08-17 PROCEDURE — 36415 COLL VENOUS BLD VENIPUNCTURE: CPT

## 2021-08-17 PROCEDURE — 74011636637 HC RX REV CODE- 636/637: Performed by: HOSPITALIST

## 2021-08-17 PROCEDURE — 82962 GLUCOSE BLOOD TEST: CPT

## 2021-08-17 PROCEDURE — 74011250636 HC RX REV CODE- 250/636: Performed by: HOSPITALIST

## 2021-08-17 RX ORDER — ATORVASTATIN CALCIUM 80 MG/1
80 TABLET, FILM COATED ORAL DAILY
Qty: 30 TABLET | Refills: 0 | Status: SHIPPED | OUTPATIENT
Start: 2021-08-17

## 2021-08-17 RX ORDER — ASPIRIN 325 MG
325 TABLET ORAL DAILY
Qty: 90 TABLET | Refills: 0 | Status: SHIPPED | OUTPATIENT
Start: 2021-08-17

## 2021-08-17 RX ORDER — CLOPIDOGREL BISULFATE 75 MG/1
75 TABLET ORAL DAILY
Qty: 90 TABLET | Refills: 0 | Status: SHIPPED | OUTPATIENT
Start: 2021-08-17 | End: 2021-08-17

## 2021-08-17 RX ORDER — CLOPIDOGREL BISULFATE 75 MG/1
75 TABLET ORAL DAILY
Qty: 21 TABLET | Refills: 0 | Status: SHIPPED | OUTPATIENT
Start: 2021-08-17 | End: 2021-09-07

## 2021-08-17 RX ADMIN — ALLOPURINOL 100 MG: 100 TABLET ORAL at 11:33

## 2021-08-17 RX ADMIN — INSULIN LISPRO 3 UNITS: 100 INJECTION, SOLUTION INTRAVENOUS; SUBCUTANEOUS at 12:38

## 2021-08-17 RX ADMIN — PANTOPRAZOLE SODIUM 40 MG: 40 TABLET, DELAYED RELEASE ORAL at 11:33

## 2021-08-17 RX ADMIN — ASPIRIN 325 MG ORAL TABLET 325 MG: 325 PILL ORAL at 11:33

## 2021-08-17 RX ADMIN — CARVEDILOL 6.25 MG: 6.25 TABLET, FILM COATED ORAL at 11:32

## 2021-08-17 RX ADMIN — ATORVASTATIN CALCIUM 80 MG: 40 TABLET, FILM COATED ORAL at 11:32

## 2021-08-17 RX ADMIN — CLOPIDOGREL BISULFATE 75 MG: 75 TABLET ORAL at 11:33

## 2021-08-17 RX ADMIN — ENOXAPARIN SODIUM 40 MG: 40 INJECTION SUBCUTANEOUS at 11:33

## 2021-08-17 NOTE — PROGRESS NOTES
Bedside shift change report given to Amos Hackett RN (oncoming nurse) by [facility]  (offgoing nurse). Report included the following information SBAR, Cardiac Rhythm nsr and Dual Neuro Assessment.

## 2021-08-17 NOTE — PROGRESS NOTES
Physician Progress Note      PATIENT:               John Smith  CSN #:                  [id number]  :                       1945  ADMIT DATE:       2021 2:19 PM  [address] DATE:        2021 4:11 PM  RESPONDING  PROVIDER #:        Jez Silverio MD          QUERY TEXT:      Dear attending,    Pt admitted with right facial droop and numbness. Pt noted to have MRI on 21 which was negative for an acute stroke. If possible, please document in progress notes and discharge summary the etiology of the Right facial numbness/weakness. The medical record reflects the following:  Risk Factors:DM, HTN  Clinical Indicators: -Per neuro- Right facial droop associated with numbness and right leg numbness. The patient reported right facial droop and numbness associated with R leg numbness and weakness which started midnight. Patient stated he has chronic right leg weakness with numbness from previous procedure couple years back but now worse  8/15-Per neuro- Suspect a small vessel lacunar infarct in the brainstem/pontine region. Risk factors include diabetes, hypertension  -Per neuro-Abebe Deshpande is a 77 y.o. male with hypertension, diabetes, congestive heart failure, who has significant intracranial atherosclerotic vascular disease, presenting with right-sided facial weakness and numbness. MRI of the brain, pending. One exam he seems to be back to his baseline. -Per PN- Right facial numbness/weakness - No acute stroke per MRI this afternoon. Treatment: neuro consult, Monitor labwork, vital signs, imaging, neuro status      Thank you    Chucky Velasquez RN, BSN  Clinical documentation Improvement  [phone]  Options provided:  -- right facial droop and numbness due to Cerebral Artery Occlusion/Stenosis (without infarction)  -- right facial droop and numbness due to TIA  -- right facial droop and numbness due to, please specify cause.   -- Other - I will add my own diagnosis  -- Disagree - Not applicable / Not valid  -- Disagree - Clinically unable to determine / Unknown  -- Refer to Clinical Documentation Reviewer    PROVIDER RESPONSE TEXT:    right facial droop and numbness are due to TIA.     Query created by: Holli Ball on 8/17/2021 8:12 AM      Electronically signed by:  Yuridia Kirby MD 8/17/2021 4:25 PM

## 2021-08-17 NOTE — PROGRESS NOTES
Hospital follow-up PCP transitional care appointment has been scheduled with Dr. Satnam Hay for Monday, 8/23/21 at 11:15 a.m. Pending patient discharge.   Shruthi Aleman, Care Management Specialist.

## 2021-08-17 NOTE — PROGRESS NOTES
Problem: Diabetes Self-Management  Goal: *Disease process and treatment process  Description: Define diabetes and identify own type of diabetes; list 3 options for treating diabetes. Outcome: Resolved/Met  Goal: *Incorporating nutritional management into lifestyle  Description: Describe effect of type, amount and timing of food on blood glucose; list 3 methods for planning meals. Outcome: Resolved/Met  Goal: *Incorporating physical activity into lifestyle  Description: State effect of exercise on blood glucose levels. Outcome: Resolved/Met  Goal: *Developing strategies to promote health/change behavior  Description: Define the ABC's of diabetes; identify appropriate screenings, schedule and personal plan for screenings. Outcome: Resolved/Met  Goal: *Using medications safely  Description: State effect of diabetes medications on diabetes; name diabetes medication taking, action and side effects. Outcome: Resolved/Met  Goal: *Monitoring blood glucose, interpreting and using results  Description: Identify recommended blood glucose targets  and personal targets. Outcome: Resolved/Met  Goal: *Prevention, detection, treatment of acute complications  Description: List symptoms of hyper- and hypoglycemia; describe how to treat low blood sugar and actions for lowering  high blood glucose level. Outcome: Resolved/Met  Goal: *Prevention, detection and treatment of chronic complications  Description: Define the natural course of diabetes and describe the relationship of blood glucose levels to long term complications of diabetes.   Outcome: Resolved/Met  Goal: *Developing strategies to address psychosocial issues  Description: Describe feelings about living with diabetes; identify support needed and support network  Outcome: Resolved/Met  Goal: *Insulin pump training  Outcome: Resolved/Met  Goal: *Sick day guidelines  Outcome: Resolved/Met  Goal: *Patient Specific Goal (EDIT GOAL, INSERT TEXT)  Outcome: Resolved/Met Problem: Patient Education: Go to Patient Education Activity  Goal: Patient/Family Education  Outcome: Resolved/Met     Problem: Falls - Risk of  Goal: *Absence of Falls  Description: Document [place] Fall Risk and appropriate interventions in the flowsheet.   Outcome: Resolved/Met     Problem: Patient Education: Go to Patient Education Activity  Goal: Patient/Family Education  Outcome: Resolved/Met     Problem: Patient Education: Go to Patient Education Activity  Goal: Patient/Family Education  Outcome: Resolved/Met     Problem: TIA/CVA Stroke: 0-24 hours  Goal: Off Pathway (Use only if patient is Off Pathway)  Outcome: Resolved/Met  Goal: Activity/Safety  Outcome: Resolved/Met  Goal: Consults, if ordered  Outcome: Resolved/Met  Goal: Diagnostic Test/Procedures  Outcome: Resolved/Met  Goal: Nutrition/Diet  Outcome: Resolved/Met  Goal: Discharge Planning  Outcome: Resolved/Met  Goal: Medications  Outcome: Resolved/Met  Goal: Respiratory  Outcome: Resolved/Met  Goal: Treatments/Interventions/Procedures  Outcome: Resolved/Met  Goal: Minimize risk of bleeding post-thrombolytic infusion  Outcome: Resolved/Met  Goal: Monitor for complications post-thrombolytic infusion  Outcome: Resolved/Met  Goal: Psychosocial  Outcome: Resolved/Met  Goal: *Hemodynamically stable  Outcome: Resolved/Met  Goal: *Neurologically stable  Description: Absence of additional neurological deficits    Outcome: Resolved/Met  Goal: *Verbalizes anxiety and depression are reduced or absent  Outcome: Resolved/Met  Goal: *Absence of Signs of Aspiration on Current Diet  Outcome: Resolved/Met  Goal: *Absence of deep venous thrombosis signs and symptoms(Stroke Metric)  Outcome: Resolved/Met  Goal: *Ability to perform ADLs and demonstrates progressive mobility and function  Outcome: Resolved/Met  Goal: *Stroke education started(Stroke Metric)  Outcome: Resolved/Met  Goal: *Dysphagia screen performed(Stroke Metric)  Outcome: Resolved/Met  Goal: *Rehab consulted(Stroke Metric)  Outcome: Resolved/Met     Problem: TIA/CVA Stroke: Day 2 Until Discharge  Goal: Off Pathway (Use only if patient is Off Pathway)  Outcome: Resolved/Met  Goal: Activity/Safety  Outcome: Resolved/Met  Goal: Diagnostic Test/Procedures  Outcome: Resolved/Met  Goal: Nutrition/Diet  Outcome: Resolved/Met  Goal: Discharge Planning  Outcome: Resolved/Met  Goal: Medications  Outcome: Resolved/Met  Goal: Respiratory  Outcome: Resolved/Met  Goal: Treatments/Interventions/Procedures  Outcome: Resolved/Met  Goal: Psychosocial  Outcome: Resolved/Met  Goal: *Verbalizes anxiety and depression are reduced or absent  Outcome: Resolved/Met  Goal: *Absence of aspiration  Outcome: Resolved/Met  Goal: *Absence of deep venous thrombosis signs and symptoms(Stroke Metric)  Outcome: Resolved/Met  Goal: *Optimal pain control at patient's stated goal  Outcome: Resolved/Met  Goal: *Tolerating diet  Outcome: Resolved/Met  Goal: *Ability to perform ADLs and demonstrates progressive mobility and function  Outcome: Resolved/Met  Goal: *Stroke education continued(Stroke Metric)  Outcome: Resolved/Met     Problem: Ischemic Stroke: Discharge Outcomes  Goal: *Verbalizes anxiety and depression are reduced or absent  Outcome: Resolved/Met  Goal: *Verbalize understanding of risk factor modification(Stroke Metric)  Outcome: Resolved/Met  Goal: *Hemodynamically stable  Outcome: Resolved/Met  Goal: *Absence of aspiration pneumonia  Outcome: Resolved/Met  Goal: *Aware of needed dietary changes  Outcome: Resolved/Met  Goal: *Verbalize understanding of prescribed medications including anti-coagulants, anti-lipid, and/or anti-platelets(Stroke Metric)  Outcome: Resolved/Met  Goal: *Tolerating diet  Outcome: Resolved/Met  Goal: *Aware of follow-up diagnostics related to anticoagulants  Outcome: Resolved/Met  Goal: *Ability to perform ADLs and demonstrates progressive mobility and function  Outcome: Resolved/Met  Goal: *Absence of DVT(Stroke Metric)  Outcome: Resolved/Met  Goal: *Absence of aspiration  Outcome: Resolved/Met  Goal: *Optimal pain control at patient's stated goal  Outcome: Resolved/Met  Goal: *Home safety concerns addressed  Outcome: Resolved/Met  Goal: *Describes available resources and support systems  Outcome: Resolved/Met  Goal: *Verbalizes understanding of activation of EMS(911) for stroke symptoms(Stroke Metric)  Outcome: Resolved/Met  Goal: *Understands and describes signs and symptoms to report to providers(Stroke Metric)  Outcome: Resolved/Met  Goal: *Neurolgocially stable (absence of additional neurological deficits)  Outcome: Resolved/Met  Goal: *Verbalizes importance of follow-up with primary care physician(Stroke Metric)  Outcome: Resolved/Met  Goal: *Smoking cessation discussed,if applicable(Stroke Metric)  Outcome: Resolved/Met  Goal: *Depression screening completed(Stroke Metric)  Outcome: Resolved/Met     Problem: Patient Education: Go to Patient Education Activity  Goal: Patient/Family Education  Outcome: Resolved/Met     Problem: Patient Education: Go to Patient Education Activity  Goal: Patient/Family Education  Outcome: Resolved/Met

## 2021-08-17 NOTE — PROGRESS NOTES
Transition of Care Plan   RUR- Low 16%   DISPOSITION: Home with family and home health [facility])   F/U with PCP/Specialist     Transport: Family    Transition of Care Plan: CM unable to identify patient's current [facility] agency; no return call from PCP office. CM sent referrals to known [facility] providers for home health PT/OT/SN. -- [facility] has accepted patient. Follow up info placed on AVS.    The Plan for Transition of Care is related to the following treatment goals: home health     The Patient was provided with a choice of provider and agrees  with the discharge plan. Yes [x] No []    A Freedom of choice list was provided with basic dialogue that supports the patient's individualized plan of care/goals and shares the quality data associated with the providers. Yes [x] No []      Medicare pt has received, reviewed, and signed 2nd IM letter informing them of their right to appeal the discharge. Signed copy has been placed on pt bedside chart. [email or web address].

## 2021-08-17 NOTE — PROGRESS NOTES
Problem: Diabetes Self-Management  Goal: *Disease process and treatment process  Description: Define diabetes and identify own type of diabetes; list 3 options for treating diabetes. Outcome: Progressing Towards Goal  Goal: *Incorporating nutritional management into lifestyle  Description: Describe effect of type, amount and timing of food on blood glucose; list 3 methods for planning meals. Outcome: Progressing Towards Goal  Goal: *Incorporating physical activity into lifestyle  Description: State effect of exercise on blood glucose levels. Outcome: Progressing Towards Goal  Goal: *Developing strategies to promote health/change behavior  Description: Define the ABC's of diabetes; identify appropriate screenings, schedule and personal plan for screenings. Outcome: Progressing Towards Goal  Goal: *Using medications safely  Description: State effect of diabetes medications on diabetes; name diabetes medication taking, action and side effects. Outcome: Progressing Towards Goal  Goal: *Monitoring blood glucose, interpreting and using results  Description: Identify recommended blood glucose targets  and personal targets. Outcome: Progressing Towards Goal  Goal: *Prevention, detection, treatment of acute complications  Description: List symptoms of hyper- and hypoglycemia; describe how to treat low blood sugar and actions for lowering  high blood glucose level. Outcome: Progressing Towards Goal  Goal: *Prevention, detection and treatment of chronic complications  Description: Define the natural course of diabetes and describe the relationship of blood glucose levels to long term complications of diabetes.   Outcome: Progressing Towards Goal  Goal: *Developing strategies to address psychosocial issues  Description: Describe feelings about living with diabetes; identify support needed and support network  Outcome: Progressing Towards Goal  Goal: *Insulin pump training  Outcome: Progressing Towards Goal  Goal: *Sick day guidelines  Outcome: Progressing Towards Goal  Goal: *Patient Specific Goal (EDIT GOAL, INSERT TEXT)  Outcome: Progressing Towards Goal     Problem: Falls - Risk of  Goal: *Absence of Falls  Description: Document [place] Fall Risk and appropriate interventions in the flowsheet.   Outcome: Progressing Towards Goal  Note: Fall Risk Interventions:  Mobility Interventions: Bed/chair exit alarm, Assess mobility with egress test, Communicate number of staff needed for ambulation/transfer, Patient to call before getting OOB, PT Consult for mobility concerns, Utilize walker, cane, or other assistive device, PT Consult for assist device competence         Medication Interventions: Bed/chair exit alarm, Evaluate medications/consider consulting pharmacy, Patient to call before getting OOB, Teach patient to arise slowly         History of Falls Interventions: Consult care management for discharge planning, Door open when patient unattended, Evaluate medications/consider consulting pharmacy, Bed/chair exit alarm         Problem: TIA/CVA Stroke: Day 2 Until Discharge  Goal: Off Pathway (Use only if patient is Off Pathway)  Outcome: Progressing Towards Goal  Goal: Activity/Safety  Outcome: Progressing Towards Goal  Goal: Diagnostic Test/Procedures  Outcome: Progressing Towards Goal  Goal: Nutrition/Diet  Outcome: Progressing Towards Goal  Goal: Discharge Planning  Outcome: Progressing Towards Goal  Goal: Medications  Outcome: Progressing Towards Goal  Goal: Respiratory  Outcome: Progressing Towards Goal  Goal: Treatments/Interventions/Procedures  Outcome: Progressing Towards Goal  Goal: Psychosocial  Outcome: Progressing Towards Goal  Goal: *Verbalizes anxiety and depression are reduced or absent  Outcome: Progressing Towards Goal  Goal: *Absence of aspiration  Outcome: Progressing Towards Goal  Goal: *Absence of deep venous thrombosis signs and symptoms(Stroke Metric)  Outcome: Progressing Towards Goal  Goal: *Optimal pain control at patient's stated goal  Outcome: Progressing Towards Goal  Goal: *Tolerating diet  Outcome: Progressing Towards Goal  Goal: *Ability to perform ADLs and demonstrates progressive mobility and function  Outcome: Progressing Towards Goal  Goal: *Stroke education continued(Stroke Metric)  Outcome: Progressing Towards Goal

## 2021-08-17 NOTE — PROGRESS NOTES
Pharmacist Discharge Medication Reconciliation    Discharging Provider: Dr. Rayne Andersen PMH:   Past Medical History:   Diagnosis Date    Arthritis     CAD (coronary artery disease)     CABG    Diabetes ([facility]     Heart failure ([facility] [address]     Hypertension     Other ill-defined conditions(799.89)     kidney stones 1990's     Chief Complaint for this Admission:   Chief Complaint   Patient presents with    Facial Droop    Numbness     Allergies: Patient has no known allergies. Discharge Medications:   Current Discharge Medication List        START taking these medications    Details   aspirin (ASPIRIN) 325 mg tablet Take 1 Tablet by mouth daily. Qty: 90 Tablet, Refills: 0  Start date: 8/17/2021      clopidogreL (PLAVIX) 75 mg tab Take 1 Tablet by mouth daily for 21 days. Qty: 21 Tablet, Refills: 0  Start date: 8/17/2021, End date: 9/7/2021           CONTINUE these medications which have CHANGED    Details   atorvastatin (LIPITOR) 80 mg tablet Take 1 Tablet by mouth daily. Qty: 30 Tablet, Refills: 0  Start date: 8/17/2021           CONTINUE these medications which have NOT CHANGED    Details   bumetanide (BUMEX) 2 mg tablet Take 2 mg by mouth two (2) times a day. lisinopriL (PRINIVIL, ZESTRIL) 5 mg tablet Take 5 mg by mouth daily. magnesium oxide (MAG-OX) 400 mg tablet Take 400 mg by mouth two (2) times a day. metFORMIN (GLUCOPHAGE) 500 mg tablet Take 500 mg by mouth two (2) times daily (with meals). allopurinoL (ZYLOPRIM) 100 mg tablet Take 100 mg by mouth daily. potassium chloride SR (KLOR-CON 10) 10 mEq tablet Take 10 mEq by mouth.      pantoprazole (PROTONIX) 40 mg tablet Take 40 mg by mouth daily. carvedilol (COREG) 6.25 mg tablet Take 1 Tab by mouth two (2) times daily (with meals). Qty: 60 Tab, Refills: 11      insulin NPH (NovoLIN N NPH U-100 Insulin) 100 unit/mL injection 40 Units by SubCUTAneous route once.       nitroglycerin (NITROSTAT) 0.4 mg SL tablet 1 Tab by SubLINGual route every five (5) minutes as needed for Chest Pain. Qty: 1 Bottle, Refills: 0           STOP taking these medications       aspirin delayed-release 81 mg tablet Comments:   Reason for Stopping:         aspirin delayed-release 81 mg tablet Comments:   Reason for Stopping:         predniSONE (DELTASONE) 10 mg tablet Comments:   Reason for Stopping:         Insulin Lisp & Lisp Prot, Hum, (HUMALOG PEN) 100 unit/mL (75-25) flexpen Comments:   Reason for Stopping:               The patient's chart, MAR and AVS were reviewed by Elvin Go.

## 2021-08-17 NOTE — PROGRESS NOTES
I have reviewed discharge instructions with the patient and daughter. The patient and daughter verbalized understanding. Stroke Education documented in Patient Education: YES  Core Measures Documented in [facility]:  Risk Factors: YES  Warning signs of stroke: YES  When to Activate 911: YES  Medication Education for Risk Factors: YES  Smoking cessation if applicable: YES  Written Education Given:  YES    Discharge NIH Completed: YES  Score: 1        Bedside RN performed patient education and medication education. Discharge concerns initiated and discussed with patient, including clarification on \"who\" assists the patient at their home and instructions for when the home going patient should call their provider after discharge. Opportunity for questions and clarification was provided. Patient receptive to education: YES  Patient stated: Verbalized Understanding  Barriers to Education: None  Diagnosis Education given:  YES    Length of stay: 4 days  Expected Day of Discharge: 8/17/2021  Ask if they have \"Help at Home\" & add to white board?   YES    Education Day #: 4    Medication Education Given:  YES  M in the box Medication name: ASA, Plavix, Lipitor    Pt aware of HCAHPS survey: YES

## 2021-08-17 NOTE — DISCHARGE SUMMARY
Discharge Summary       PATIENT ID: Carlos Dominguez  MRN: [id number]   YOB: 1945    DATE OF ADMISSION: 8/14/2021  2:19 PM    DATE OF DISCHARGE: 08/17/2021  PRIMARY CARE PROVIDER: Mathew Garvin MD     ATTENDING PHYSICIAN: Vicky Choe   DISCHARGING PROVIDER: Deana Clinton MD    To contact this individual call [phone] and ask the  to page. If unavailable ask to be transferred the Adult Hospitalist Department. CONSULTATIONS: IP CONSULT TO NEUROLOGY  IP CONSULT TO NEUROLOGY    PROCEDURES/SURGERIES: * No surgery found *    ADMITTING DIAGNOSES & HOSPITAL COURSE:   Right facial numbness/weakness      JULIO CESAR on CKD Stage IIIa      Gait abnormality. Physical therapy.     CAD. S/p CABG       Hyperlipidemia        DISCHARGE DIAGNOSES / PLAN:      Right facial numbness/weakness - No acute stroke per MRI. · Appreciate guidance from Dr. Jessica Forte neurologist   Neurology recommended to continue Plavix 75 mg for 3 weeks then continue Monotherapy ASA 325mg daily thereafter. I clarified with neurology before discharge and recommended Plavix 75 mg daily for 3 weeks with aspirin 325 mg daily pain monotherapy with aspirin 325 mg daily. Aspirin and Plavix and statin were sent to the pharmacy the patient asked for because he usually gets his medications through delivery and the concern was that it will take a while to deliver his antiplatelets. I given Plavix for 3 weeks and aspirin for 90 days and starting him for 90 days till he sees his primary care physician. Continue statin. Echo was done during hospitalization  · LV: Estimated LVEF is 55 - 60%. Normal cavity size and systolic function (ejection fraction normal). Upper normal wall thickness. Mild (grade 1) left ventricular diastolic dysfunction. · LA: Mildly dilated left atrium. · MV: Mild mitral valve regurgitation is present. · RV: Mildly reduced systolic function.   · No bubble study performed.     JULIO CESAR on CKD Stage IIIa -Improving creatinine. Baseline creatinine appears to be about 1.45. Patient presented with creatinine of 2.8, now down to 1.48     Gait abnormality. Physical therapy.     CAD. S/p CABG  · Continue aspirin, Lipitor, coreg.     Hyperlipidemia -on high dose Statin Rx   · Continue          FOLLOW UP APPOINTMENTS:    Follow-up Information     Follow up With Specialties Details Why Contact Houston Reed MD Family Medicine On 8/23/2021 Hosptital follow up PCP appointment Monday, 8/23/21 at 11:15 a.m. [address], [address] [place] [address]  [phone]             DIET: Resume previous diet          DISCHARGE MEDICATIONS:  Current Discharge Medication List      START taking these medications    Details   aspirin (ASPIRIN) 325 mg tablet Take 1 Tablet by mouth daily. Qty: 90 Tablet, Refills: 0  Start date: 8/17/2021      clopidogreL (PLAVIX) 75 mg tab Take 1 Tablet by mouth daily for 21 days. Qty: 21 Tablet, Refills: 0  Start date: 8/17/2021, End date: 9/7/2021         CONTINUE these medications which have CHANGED    Details   atorvastatin (LIPITOR) 80 mg tablet Take 1 Tablet by mouth daily. Qty: 30 Tablet, Refills: 0  Start date: 8/17/2021         CONTINUE these medications which have NOT CHANGED    Details   bumetanide (BUMEX) 2 mg tablet Take 2 mg by mouth two (2) times a day. lisinopriL (PRINIVIL, ZESTRIL) 5 mg tablet Take 5 mg by mouth daily. magnesium oxide (MAG-OX) 400 mg tablet Take 400 mg by mouth two (2) times a day. metFORMIN (GLUCOPHAGE) 500 mg tablet Take 500 mg by mouth two (2) times daily (with meals). allopurinoL (ZYLOPRIM) 100 mg tablet Take 100 mg by mouth daily. potassium chloride SR (KLOR-CON 10) 10 mEq tablet Take 10 mEq by mouth.      pantoprazole (PROTONIX) 40 mg tablet Take 40 mg by mouth daily.       carvedilol (COREG) 6.25 mg tablet Take 1 Tab by mouth two (2) times daily (with meals). Qty: 60 Tab, Refills: 11      insulin NPH (NovoLIN N NPH U-100 Insulin) 100 unit/mL injection 40 Units by SubCUTAneous route once. nitroglycerin (NITROSTAT) 0.4 mg SL tablet 1 Tab by SubLINGual route every five (5) minutes as needed for Chest Pain. Qty: 1 Bottle, Refills: 0         STOP taking these medications       aspirin delayed-release 81 mg tablet Comments:   Reason for Stopping:         aspirin delayed-release 81 mg tablet Comments:   Reason for Stopping:         predniSONE (DELTASONE) 10 mg tablet Comments:   Reason for Stopping:         Insulin Lisp & Lisp Prot, Hum, (HUMALOG PEN) 100 unit/mL (75-25) flexpen Comments:   Reason for Stopping:                 NOTIFY YOUR PHYSICIAN FOR ANY OF THE FOLLOWING:   Fever over 101 degrees for 24 hours. Chest pain, shortness of breath, fever, chills, nausea, vomiting, diarrhea, change in mentation, falling, weakness, bleeding. Severe pain or pain not relieved by medications. Or, any other signs or symptoms that you may have questions about. DISPOSITION:  x  Home With:   OT  PT  HH  RN       Long term SNF/Inpatient Rehab    Independent/assisted living    Hospice    Other:       PATIENT CONDITION AT DISCHARGE:     Functional status    Poor     Deconditioned    x Independent        Catheters/lines (plus indication)    Shankar     PICC     PEG    x None      Code status   x  Full code     DNR      PHYSICAL EXAMINATION AT DISCHARGE:  General:          Alert, cooperative, no distress, appears stated age. HEENT:           Atraumatic, anicteric sclerae, pink conjunctivae                          No oral ulcers, mucosa moist, throat clear, dentition fair  Neck:               Supple, symmetrical  Lungs:             Clear to auscultation bilaterally. No Wheezing or Rhonchi. No rales. Chest wall:      No tenderness  No Accessory muscle use.   Heart:              Regular  rhythm,  No  murmur   No edema  Abdomen: Soft, non-tender. Not distended. Bowel sounds normal  Extremities:     No cyanosis. No clubbing,                            Skin turgor normal, Capillary refill normal  Skin:                Not pale. Not Jaundiced  No rashes   Psych:             Not anxious or agitated.   Neurologic:      Alert, moves all extremities, answers questions appropriately and responds to commands       CHRONIC MEDICAL DIAGNOSES:  Problem List as of 8/17/2021 Date Reviewed: 8/3/2020        Codes Class Noted - Resolved    ACS (acute coronary syndrome) ([facility] ICD-10-CM: I24.9  ICD-9-CM: 411.1  2/17/2011 - Present        DM (diabetes mellitus), type 2, uncontrolled (HCC) (Chronic) ICD-10-CM: E11.65  ICD-9-CM: 250.02  2/17/2011 - Present        CAD (coronary artery disease) of artery bypass graft (Chronic) ICD-10-CM: I25.810  ICD-9-CM: 414.05  2/17/2011 - Present        HTN (hypertension) (Chronic) ICD-10-CM: I10  ICD-9-CM: 401.9  2/17/2011 - Present        Other and unspecified hyperlipidemia (Chronic) ICD-10-CM: E78.5  ICD-9-CM: 272.4  2/17/2011 - Present        Weakness on right side of face ICD-10-CM: R29.810  ICD-9-CM: 781.94  8/15/2021 - Present        * (Principal) CVA (cerebral vascular accident) ([facility] ICD-10-CM: I63.9  ICD-9-CM: 434.91  8/14/2021 - Present        Metabolic acidosis XBB-40-ML: E87.2  ICD-9-CM: 276.2  8/1/2020 - Present        Renal failure ICD-10-CM: N19  ICD-9-CM: 404  8/1/2020 - Present        Mitral regurgitation ICD-10-CM: I34.0  ICD-9-CM: 424.0  4/5/2012 - Present        Aortic stenosis ICD-10-CM: I35.0  ICD-9-CM: 424.1  4/5/2012 - Present        Acute MI, inferolateral wall (HCC) ICD-10-CM: I21.19  ICD-9-CM: 410.20  4/5/2012 - Present        S/P CABG (coronary artery bypass graft) ICD-10-CM: Z95.1  ICD-9-CM: V45.81  4/5/2012 - Present        Chest pain, unspecified ICD-10-CM: R07.9  ICD-9-CM: 786.50  7/20/2011 - Present              Greater than35 minutes were spent with the patient on counseling and coordination of care    Signed:   Robyn Carlson MD  8/17/2021  2:54 PM